# Patient Record
Sex: MALE | Race: WHITE | NOT HISPANIC OR LATINO | Employment: FULL TIME | URBAN - METROPOLITAN AREA
[De-identification: names, ages, dates, MRNs, and addresses within clinical notes are randomized per-mention and may not be internally consistent; named-entity substitution may affect disease eponyms.]

---

## 2017-02-07 ENCOUNTER — OFFICE VISIT - HEALTHEAST (OUTPATIENT)
Dept: FAMILY MEDICINE | Facility: CLINIC | Age: 52
End: 2017-02-07

## 2017-02-07 DIAGNOSIS — J06.9 URI (UPPER RESPIRATORY INFECTION): ICD-10-CM

## 2017-02-07 ASSESSMENT — MIFFLIN-ST. JEOR: SCORE: 1703.55

## 2017-12-06 ENCOUNTER — RECORDS - HEALTHEAST (OUTPATIENT)
Dept: ADMINISTRATIVE | Facility: OTHER | Age: 52
End: 2017-12-06

## 2018-05-09 ENCOUNTER — APPOINTMENT (OUTPATIENT)
Dept: MRI IMAGING | Facility: CLINIC | Age: 53
End: 2018-05-09
Attending: EMERGENCY MEDICINE
Payer: COMMERCIAL

## 2018-05-09 ENCOUNTER — HOSPITAL ENCOUNTER (EMERGENCY)
Facility: CLINIC | Age: 53
Discharge: HOME | End: 2018-05-09
Attending: EMERGENCY MEDICINE | Admitting: EMERGENCY MEDICINE
Payer: COMMERCIAL

## 2018-05-09 VITALS
WEIGHT: 180 LBS | OXYGEN SATURATION: 98 % | HEART RATE: 84 BPM | RESPIRATION RATE: 12 BRPM | SYSTOLIC BLOOD PRESSURE: 124 MMHG | TEMPERATURE: 98.6 F | DIASTOLIC BLOOD PRESSURE: 81 MMHG

## 2018-05-09 DIAGNOSIS — R42 DIZZINESS: ICD-10-CM

## 2018-05-09 DIAGNOSIS — R20.2 PARESTHESIAS: ICD-10-CM

## 2018-05-09 LAB
ANION GAP SERPL CALCULATED.3IONS-SCNC: 6 MMOL/L (ref 3–14)
BASOPHILS # BLD AUTO: 0 10E9/L (ref 0–0.2)
BASOPHILS NFR BLD AUTO: 0.3 %
BUN SERPL-MCNC: 22 MG/DL (ref 7–30)
CALCIUM SERPL-MCNC: 8.6 MG/DL (ref 8.5–10.1)
CHLORIDE SERPL-SCNC: 103 MMOL/L (ref 94–109)
CO2 SERPL-SCNC: 28 MMOL/L (ref 20–32)
CREAT SERPL-MCNC: 1.11 MG/DL (ref 0.66–1.25)
DIFFERENTIAL METHOD BLD: NORMAL
EOSINOPHIL # BLD AUTO: 0.1 10E9/L (ref 0–0.7)
EOSINOPHIL NFR BLD AUTO: 1.7 %
ERYTHROCYTE [DISTWIDTH] IN BLOOD BY AUTOMATED COUNT: 12.4 % (ref 10–15)
GFR SERPL CREATININE-BSD FRML MDRD: 70 ML/MIN/1.7M2
GLUCOSE SERPL-MCNC: 149 MG/DL (ref 70–99)
HCT VFR BLD AUTO: 43.2 % (ref 40–53)
HGB BLD-MCNC: 15.5 G/DL (ref 13.3–17.7)
IMM GRANULOCYTES # BLD: 0 10E9/L (ref 0–0.4)
IMM GRANULOCYTES NFR BLD: 0.3 %
INTERPRETATION ECG - MUSE: NORMAL
LYMPHOCYTES # BLD AUTO: 1.9 10E9/L (ref 0.8–5.3)
LYMPHOCYTES NFR BLD AUTO: 29.9 %
MAGNESIUM SERPL-MCNC: 2.3 MG/DL (ref 1.6–2.3)
MCH RBC QN AUTO: 32.1 PG (ref 26.5–33)
MCHC RBC AUTO-ENTMCNC: 35.9 G/DL (ref 31.5–36.5)
MCV RBC AUTO: 89 FL (ref 78–100)
MONOCYTES # BLD AUTO: 0.4 10E9/L (ref 0–1.3)
MONOCYTES NFR BLD AUTO: 5.8 %
NEUTROPHILS # BLD AUTO: 4 10E9/L (ref 1.6–8.3)
NEUTROPHILS NFR BLD AUTO: 62 %
NRBC # BLD AUTO: 0 10*3/UL
NRBC BLD AUTO-RTO: 0 /100
PLATELET # BLD AUTO: 182 10E9/L (ref 150–450)
POTASSIUM SERPL-SCNC: 3.9 MMOL/L (ref 3.4–5.3)
RBC # BLD AUTO: 4.83 10E12/L (ref 4.4–5.9)
SODIUM SERPL-SCNC: 137 MMOL/L (ref 133–144)
TROPONIN I SERPL-MCNC: <0.015 UG/L (ref 0–0.04)
VIT B12 SERPL-MCNC: 350 PG/ML (ref 193–986)
WBC # BLD AUTO: 6.4 10E9/L (ref 4–11)

## 2018-05-09 PROCEDURE — 83735 ASSAY OF MAGNESIUM: CPT | Performed by: EMERGENCY MEDICINE

## 2018-05-09 PROCEDURE — A9585 GADOBUTROL INJECTION: HCPCS | Performed by: EMERGENCY MEDICINE

## 2018-05-09 PROCEDURE — 70553 MRI BRAIN STEM W/O & W/DYE: CPT

## 2018-05-09 PROCEDURE — 82607 VITAMIN B-12: CPT | Performed by: EMERGENCY MEDICINE

## 2018-05-09 PROCEDURE — 80048 BASIC METABOLIC PNL TOTAL CA: CPT | Performed by: EMERGENCY MEDICINE

## 2018-05-09 PROCEDURE — 25000128 H RX IP 250 OP 636: Performed by: EMERGENCY MEDICINE

## 2018-05-09 PROCEDURE — 99285 EMERGENCY DEPT VISIT HI MDM: CPT | Mod: 25

## 2018-05-09 PROCEDURE — 85025 COMPLETE CBC W/AUTO DIFF WBC: CPT | Performed by: EMERGENCY MEDICINE

## 2018-05-09 PROCEDURE — 93005 ELECTROCARDIOGRAM TRACING: CPT

## 2018-05-09 PROCEDURE — 84484 ASSAY OF TROPONIN QUANT: CPT | Performed by: EMERGENCY MEDICINE

## 2018-05-09 RX ORDER — GADOBUTROL 604.72 MG/ML
8 INJECTION INTRAVENOUS ONCE
Status: COMPLETED | OUTPATIENT
Start: 2018-05-09 | End: 2018-05-09

## 2018-05-09 RX ADMIN — GADOBUTROL 8 ML: 604.72 INJECTION INTRAVENOUS at 21:08

## 2018-05-09 ASSESSMENT — ENCOUNTER SYMPTOMS
WEAKNESS: 0
NUMBNESS: 1
COUGH: 0
VOMITING: 0
FATIGUE: 0
LIGHT-HEADEDNESS: 1
DIZZINESS: 1
NAUSEA: 0
HEADACHES: 0
SHORTNESS OF BREATH: 0
FEVER: 0

## 2018-05-09 NOTE — ED AVS SNAPSHOT
Emergency Department    [address] [place] [address]    Phone:  [phone]    Fax:  [phone]                                       Lamont Ng   MRN: [id number]    Department:   Emergency Department   Date of Visit:  5/9/2018           After Visit Summary Signature Page     I have received my discharge instructions, and my questions have been answered. I have discussed any challenges I see with this plan with the nurse or doctor.    ..........................................................................................................................................  Patient/Patient Representative Signature      ..........................................................................................................................................  Patient Representative Print Name and Relationship to Patient    ..................................................               ................................................  Date                                            Time    ..........................................................................................................................................  Reviewed by Signature/Title    ...................................................              ..............................................  Date                                                            Time

## 2018-05-09 NOTE — ED AVS SNAPSHOT
Emergency Department    [address] [facility] [address]    Phone:  [phone]    Fax:  [phone]                                       Lamont Ng   MRN: [id number]    Department:   Emergency Department   Date of Visit:  5/9/2018           Patient Information     Date Of Birth          1965        Your diagnoses for this visit were:     Paresthesias     Dizziness        You were seen by Leodan Reynolds MD.      Follow-up Information     Follow up with Clinic, [facility].    Contact information:    [address] [facility] [address] [phone]          Discharge Instructions         Vertigo (Unknown Cause)    In addition to helping with hearing, the inner ear is part of the balance center of your body. Problems with the inner ear can a false feeling of motion. This is called vertigo. Often, it feels as if you or the room is spinning. A vertigo attack may cause sudden nausea, vomiting and heavy sweating. Severe vertigo causes a loss of balance and can cause you to fall. During vertigo, small head movements and changes in body position will often make the symptoms worse. You may also have ringing in the ears called tinnitus.  An episode of vertigo may last seconds, minutes or hours. Once you are over the first episode, it may never come back. However, symptoms may return off and on.  The cause of your vertigo is not yet known. Possible causes of vertigo include:    Inflammation of the inner ear    Disease of the nerves to the inner ear    Movement of calcium particles in the inner ear    Poor blood flow to the balance centers of the brain    Migraine headaches    In older adults, the use of more than one medicine along with some health conditions  Home care    If symptoms are severe, rest quietly in bed. Change positions very slowly. There is usually one position that will feel best, such as lying on one side or lying on your back with your head slightly raised on pillows.  "Until you have no symptoms, you are at a higher risk of falling. Let someone help you when you get up. Get rid of home hazards such as loose electrical cords and throw rugs. Don t walk in unfamiliar areas that are not lighted. Use night lights in bathrooms and kitchen areas.    Do not drive a car or work with dangerous machinery until symptoms have been gone for at least one week.    Take medicine as prescribed to relieve your symptoms. Unless another medicine was prescribed for symptoms of nausea, vomiting, and dizziness, you may use over-the-counter motion sickness pills. Ask your pharmacist for suggestions.  Follow-up care  Follow up with your healthcare provider or as directed. If you are referred to a specialist or for testing, make the appointment promptly.  When to seek medical advice  Call your healthcare provider if any of the following occur:    Fever of 100.4 F (38 C) or higher, or as directed by your healthcare provider    Vertigo worsens or is not controlled by prescribed medicine     Repeated vomiting not relieved by prescribed medicine     Severe headache    Confusion    Weakness of an arm or leg or 1 side of the face    Difficulty with speech or vision    Loss of consciousness     Seizure  Date Last Reviewed: 11/1/2017 2000-2017 [employer]. [address] [place] [address]. All rights reserved. This information is not intended as a substitute for professional medical care. Always follow your healthcare professional's instructions.          Paraesthesias  Paraesthesia is a burning or prickling sensation that is sometimes felt in the hands, arms, legs or feet. It can also occur in other parts of the body. It can also feel like tingling or numbness, skin crawling, or itching. The feeling is not comfortable, but it is not painful. (The \"pins and needles\" feeling that happens when a foot or hand \"falls asleep\" is a temporary paraesthesia.)  Paraesthesias that last or come and go " may be caused by medical issues that need to be treated. These include stroke, a bulging disk pressing on a nerve, a trapped nerve, vitamin deficiencies, or even certain medicines.  Tests are often done. These tests may include blood tests, X-ray, CT (computerized tomography) scan, or a muscle test (electromyography). Depending on the cause, treatment may include physical therapy.  Home care    Tell the healthcare provider about all medicines you take. This includes prescription and over-the-counter medicines, vitamins, and herbs. Ask if any of the medicines may be causing your problems. Do not make any changes to prescription medicines without talking to your healthcare provider first.    You may be prescribed medicines to help relieve the tingling feeling or for pain. Take all medicines as directed.    A numb hand or foot may be more prone to injury. To help protect it:  ? Always use oven mitts.  ? Test water with an unaffected hand or foot.  ? Use caution when trimming nails. File sharp areas.  ? Wear shoes that fit well to avoid pressure points, blisters, and ulcers.  ? Inspect your hands and feet carefully (including the soles of your feet and between your toes) at least once a week. If you see red areas, sores, or other problems, tell your healthcare provider.  Follow-up care  Follow up with your doctor or as advised by our staff. You may need further testing or evaluation.  When to seek medical advice  Call your healthcare provider right away if any of the following occur:    Numbness or weakness of the face, one arm, or one leg    Slurred speech, confusion, trouble speaking, walking, or seeing    Severe headache, fainting spell, dizziness, or seizure    Chest, arm, neck, or upper back pain    Loss of bladder or bowel control    Open wound with redness, swelling, or pus  Date Last Reviewed: 9/25/2015 2000-2017 [employer]. [address] [place] [address], [place] [address]. All rights reserved. This  information is not intended as a substitute for professional medical care. Always follow your healthcare professional's instructions.          24 Hour Appointment Hotline       To make an appointment at any [facility], call [phone] ([phone]). If you don't have a family doctor or clinic, we will help you find one. [facility] are conveniently located to serve the needs of you and your family.             Review of your medicines      Notice     You have not been prescribed any medications.            Procedures and tests performed during your visit     Basic metabolic panel    CBC with platelets differential    EKG 12-lead, tracing only    MR Brain w/o & w Contrast    Magnesium    Troponin I    Vitamin B12      Orders Needing Specimen Collection     None      Pending Results     Date and Time Order Name Status Description    5/9/2018 1847 Vitamin B12 In process             Pending Culture Results     No orders found from 5/7/2018 to 5/10/2018.            Pending Results Instructions     If you had any lab results that were not finalized at the time of your Discharge, you can call the ED Lab Result RN at [phone]. You will be contacted by this team for any positive Lab results or changes in treatment. The nurses are available 7 days a week from 10A to 6:30P.  You can leave a message 24 hours per day and they will return your call.        Test Results From Your Hospital Stay        5/9/2018  6:56 PM      Component Results     Component Value Ref Range & Units Status    WBC 6.4 4.0 - 11.0 10e9/L Final    RBC Count 4.83 4.4 - 5.9 10e12/L Final    Hemoglobin 15.5 13.3 - 17.7 g/dL Final    Hematocrit 43.2 40.0 - 53.0 % Final    MCV 89 78 - 100 fl Final    MCH 32.1 26.5 - 33.0 pg Final    MCHC 35.9 31.5 - 36.5 g/dL Final    RDW 12.4 10.0 - 15.0 % Final    Platelet Count 182 150 - 450 10e9/L Final    Diff Method Automated Method  Final    % Neutrophils 62.0 % Final    % Lymphocytes 29.9 % Final     % Monocytes 5.8 % Final    % Eosinophils 1.7 % Final    % Basophils 0.3 % Final    % Immature Granulocytes 0.3 % Final    Nucleated RBCs 0 0 /100 Final    Absolute Neutrophil 4.0 1.6 - 8.3 10e9/L Final    Absolute Lymphocytes 1.9 0.8 - 5.3 10e9/L Final    Absolute Monocytes 0.4 0.0 - 1.3 10e9/L Final    Absolute Eosinophils 0.1 0.0 - 0.7 10e9/L Final    Absolute Basophils 0.0 0.0 - 0.2 10e9/L Final    Abs Immature Granulocytes 0.0 0 - 0.4 10e9/L Final    Absolute Nucleated RBC 0.0  Final         5/9/2018  7:12 PM      Component Results     Component Value Ref Range & Units Status    Sodium 137 133 - 144 mmol/L Final    Potassium 3.9 3.4 - 5.3 mmol/L Final    Chloride 103 94 - 109 mmol/L Final    Carbon Dioxide 28 20 - 32 mmol/L Final    Anion Gap 6 3 - 14 mmol/L Final    Glucose 149 (H) 70 - 99 mg/dL Final    Urea Nitrogen 22 7 - 30 mg/dL Final    Creatinine 1.11 0.66 - 1.25 mg/dL Final    GFR Estimate 70 >60 mL/min/1.7m2 Final    Non  GFR Calc    GFR Estimate If Black 84 >60 mL/min/1.7m2 Final    African American GFR Calc    Calcium 8.6 8.5 - 10.1 mg/dL Final         5/9/2018  7:17 PM      Component Results     Component Value Ref Range & Units Status    Troponin I ES <0.015 0.000 - 0.045 ug/L Final    The 99th percentile for upper reference range is 0.045 ug/L.  Troponin values   in the range of 0.045 - 0.120 ug/L may be associated with risks of adverse   clinical events.           5/9/2018 10:00 PM      Narrative     MRI BRAIN WITHOUT AND WITH CONTRAST  5/9/2018 9:09 PM    HISTORY:  Vague dizziness for a few months, getting more constant,  tonight double vision, right facial numbness.      TECHNIQUE:  Multiplanar, multisequence MRI of the brain without and  with 8 mL Gadavist.    COMPARISON: None.    FINDINGS: There is no evidence of hemorrhage, mass, acute infarct, or  anomaly. The brain parenchyma, ventricles and subarachnoid spaces  appear normal.     There is no abnormal intracranial  enhancement.     The facial structures appear normal. The major arterial T2 flow voids  at the base of the brain appear patent.        Impression     IMPRESSION:  Normal MRI of the brain.      STEPHEN WALDRON MD         5/9/2018  7:51 PM      Component Results     Component Value Ref Range & Units Status    Magnesium 2.3 1.6 - 2.3 mg/dL Final         5/9/2018  7:46 PM                Clinical Quality Measure: Blood Pressure Screening     Your blood pressure was checked while you were in the emergency department today. The last reading we obtained was  BP: 124/81 . Please read the guidelines below about what these numbers mean and what you should do about them.  If your systolic blood pressure (the top number) is less than 120 and your diastolic blood pressure (the bottom number) is less than 80, then your blood pressure is normal. There is nothing more that you need to do about it.  If your systolic blood pressure (the top number) is 120-139 or your diastolic blood pressure (the bottom number) is 80-89, your blood pressure may be higher than it should be. You should have your blood pressure rechecked within a year by a primary care provider.  If your systolic blood pressure (the top number) is 140 or greater or your diastolic blood pressure (the bottom number) is 90 or greater, you may have high blood pressure. High blood pressure is treatable, but if left untreated over time it can put you at risk for heart attack, stroke, or kidney failure. You should have your blood pressure rechecked by a primary care provider within the next 4 weeks.  If your provider in the emergency department today gave you specific instructions to follow-up with your doctor or provider even sooner than that, you should follow that instruction and not wait for up to 4 weeks for your follow-up visit.        Thank you for choosing [facility]       Thank you for choosing [facility] for your care. Our goal is always to provide you with excellent care.  "Hearing back from our patients is one way we can continue to improve our services. Please take a few minutes to complete the written survey that you may receive in the mail after you visit with us. Thank you!        [employer] Information     [employer] lets you send messages to your doctor, view your test results, renew your prescriptions, schedule appointments and more. To sign up, go to [email or web address] . Click on \"Log in\" on the left side of the screen, which will take you to the Welcome page. Then click on \"Sign up Now\" on the right side of the page.     You will be asked to enter the access code listed below, as well as some personal information. Please follow the directions to create your username and password.     Your access code is: [email or web address]  Expires: 2018 10:21 PM     Your access code will  in 90 days. If you need help or a new code, please call your [facility] clinic or [phone].        Care EveryWhere ID     This is your Care EveryWhere ID. This could be used by other organizations to access your [facility] medical records  [id number]        Equal Access to Services     KARUNA CASTILLO : Hadii ayah Nix, yamilka moise, qabhupendra day, anette garcia . So [facility] [phone].    ATENCIÓN: Si habla español, tiene a benito disposición servicios gratuitos de asistencia lingüística. Robbie al [phone].    We comply with applicable federal civil rights laws and [place] laws. We do not discriminate on the basis of race, color, national origin, age, disability, sex, sexual orientation, or gender identity.            After Visit Summary       This is your record. Keep this with you and show to your community pharmacist(s) and doctor(s) at your next visit.                  "

## 2018-05-10 NOTE — DISCHARGE INSTRUCTIONS
Vertigo (Unknown Cause)    In addition to helping with hearing, the inner ear is part of the balance center of your body. Problems with the inner ear can a false feeling of motion. This is called vertigo. Often, it feels as if you or the room is spinning. A vertigo attack may cause sudden nausea, vomiting and heavy sweating. Severe vertigo causes a loss of balance and can cause you to fall. During vertigo, small head movements and changes in body position will often make the symptoms worse. You may also have ringing in the ears called tinnitus.  An episode of vertigo may last seconds, minutes or hours. Once you are over the first episode, it may never come back. However, symptoms may return off and on.  The cause of your vertigo is not yet known. Possible causes of vertigo include:    Inflammation of the inner ear    Disease of the nerves to the inner ear    Movement of calcium particles in the inner ear    Poor blood flow to the balance centers of the brain    Migraine headaches    In older adults, the use of more than one medicine along with some health conditions  Home care    If symptoms are severe, rest quietly in bed. Change positions very slowly. There is usually one position that will feel best, such as lying on one side or lying on your back with your head slightly raised on pillows. Until you have no symptoms, you are at a higher risk of falling. Let someone help you when you get up. Get rid of home hazards such as loose electrical cords and throw rugs. Don t walk in unfamiliar areas that are not lighted. Use night lights in bathrooms and kitchen areas.    Do not drive a car or work with dangerous machinery until symptoms have been gone for at least one week.    Take medicine as prescribed to relieve your symptoms. Unless another medicine was prescribed for symptoms of nausea, vomiting, and dizziness, you may use over-the-counter motion sickness pills. Ask your pharmacist for suggestions.  Follow-up  "care  Follow up with your healthcare provider or as directed. If you are referred to a specialist or for testing, make the appointment promptly.  When to seek medical advice  Call your healthcare provider if any of the following occur:    Fever of 100.4 F (38 C) or higher, or as directed by your healthcare provider    Vertigo worsens or is not controlled by prescribed medicine     Repeated vomiting not relieved by prescribed medicine     Severe headache    Confusion    Weakness of an arm or leg or 1 side of the face    Difficulty with speech or vision    Loss of consciousness     Seizure  Date Last Reviewed: 11/1/2017 2000-2017 [employer]. [address] [place] [address]. All rights reserved. This information is not intended as a substitute for professional medical care. Always follow your healthcare professional's instructions.          Paraesthesias  Paraesthesia is a burning or prickling sensation that is sometimes felt in the hands, arms, legs or feet. It can also occur in other parts of the body. It can also feel like tingling or numbness, skin crawling, or itching. The feeling is not comfortable, but it is not painful. (The \"pins and needles\" feeling that happens when a foot or hand \"falls asleep\" is a temporary paraesthesia.)  Paraesthesias that last or come and go may be caused by medical issues that need to be treated. These include stroke, a bulging disk pressing on a nerve, a trapped nerve, vitamin deficiencies, or even certain medicines.  Tests are often done. These tests may include blood tests, X-ray, CT (computerized tomography) scan, or a muscle test (electromyography). Depending on the cause, treatment may include physical therapy.  Home care    Tell the healthcare provider about all medicines you take. This includes prescription and over-the-counter medicines, vitamins, and herbs. Ask if any of the medicines may be causing your problems. Do not make any changes to " prescription medicines without talking to your healthcare provider first.    You may be prescribed medicines to help relieve the tingling feeling or for pain. Take all medicines as directed.    A numb hand or foot may be more prone to injury. To help protect it:  ? Always use oven mitts.  ? Test water with an unaffected hand or foot.  ? Use caution when trimming nails. File sharp areas.  ? Wear shoes that fit well to avoid pressure points, blisters, and ulcers.  ? Inspect your hands and feet carefully (including the soles of your feet and between your toes) at least once a week. If you see red areas, sores, or other problems, tell your healthcare provider.  Follow-up care  Follow up with your doctor or as advised by our staff. You may need further testing or evaluation.  When to seek medical advice  Call your healthcare provider right away if any of the following occur:    Numbness or weakness of the face, one arm, or one leg    Slurred speech, confusion, trouble speaking, walking, or seeing    Severe headache, fainting spell, dizziness, or seizure    Chest, arm, neck, or upper back pain    Loss of bladder or bowel control    Open wound with redness, swelling, or pus  Date Last Reviewed: 9/25/2015 2000-2017 [employer]. [address] [place] [address], [place] [address]. All rights reserved. This information is not intended as a substitute for professional medical care. Always follow your healthcare professional's instructions.

## 2018-05-10 NOTE — ED PROVIDER NOTES
History     Chief Complaint:  Dizziness    HPI   Lamont Ng is a 53 year old male who presents with dizziness. The patient reports that he has been experiencing symptoms intermittently over the past 6 months including dizziness and lightheadedness. Patient regularly gets a physical exam done at [facility] and he was seen by them in March for similar symptoms. At that time, he had an EKG performed along with a STRESS test and blood work; no abnormal etiology was iundentified at this visit. He was advised at this time to possibly cut down on caffeine; he has done this and has not noticed any significant change in his symptoms. After this time, he went down to [place] with his children, and throughout that time he was asymptomatic other than some anxiety about the trip.     Within past couple of weeks, his lightheadedness has become more prevalent. He relates this lightheadedness as being similar to having 1-2 glasses of wine, but wihtout any alcohol ingestion. He presents today for evaluation of these symptoms as well as having facial paraesthesias and some visual disturbances that occurred earlier today. He states he is concerned for sinister pathology and would like to have life threatening etiologies ruled out today. He denies any chest pain, shortness of breath, numbness, weakness, or any other symptoms.    Allergies:  No known drug allergies.    Medications:    The patient is not currently taking any prescribed medications.     Past Medical History:    Cancer    Past Surgical History:    Hernia repair  Soft tissue surgery    Family History:    No pertinent family history.    Social History:  Smoking status: Never smoker  Alcohol use: Yes  Marital Status:       Review of Systems   Constitutional: Negative for fatigue and fever.   Eyes: Negative for visual disturbance.   Respiratory: Negative for cough and shortness of breath.    Gastrointestinal: Negative for nausea and vomiting.   Neurological:  Positive for dizziness, light-headedness and numbness. Negative for weakness and headaches.   All other systems reviewed and are negative.    Physical Exam     Patient Vitals for the past 24 hrs:   BP Temp Temp src Pulse Heart Rate Resp SpO2 Weight   05/09/18 2200 124/81 - - - - - 98 % -   05/09/18 2145 129/86 - - - - - 98 % -   05/09/18 2130 (!) 129/107 - - - - - 97 % -   05/09/18 2115 134/85 - - - - - 97 % -   05/09/18 1939 (!) 143/92 - - - - - - -   05/09/18 1859 - - - - 77 12 - -   05/09/18 1849 - 98.6  F (37  C) Temporal - - - - -   05/09/18 1836 146/87 - Oral 84 - 16 100 % 81.6 kg (180 lb)         Physical Exam  GENERAL: well developed, pleasant  HEAD: atraumatic  EYES: pupils reactive, extraocular muscles intact, conjunctivae normal  ENT:  mucus membranes moist  NECK:  trachea midline, normal range of motion  RESPIRATORY: no tachypnea, breath sounds clear to auscultation   CVS: normal S1/S2, no murmurs, intact distal pulses  ABDOMEN: soft, nontender, nondistention  MUSCULOSKELETAL: no deformities  SKIN: warm and dry, no acute rashes or ulceration  NEURO: GCS 15, cranial nerves intact, alert and oriented x3  PSYCH:  Mood/affect normal    Emergency Department Course   ECG:  @ 1845  Indication: dizziness  Vent. Rate 85 bpm. MI interval 154 ms. QRS duration 84 ms. QT/QTc 344/409 ms. P-R-T axis 57 29 29.   Normal sinus rhythm. Normal ECG.    Read @ 1930 by Dr. Reynolds.    Imaging:  MRI Brain w/o & w/ contrast:  IMPRESSION:  Normal MRI of the brain.   Report per radiology.    Laboratory:  CBC:  WBC 6.4, HGB 15.5, , WNL  BMP: Glucose 149 (H), otherwise WNL (Creatinine 1.11)  (1847) Troponin I: <0.015  (1847) Magnesium: 2.3  (1847) Vitamin B12: PENDING    Interventions:  (2108) Gadavist, 10 mL, IV injection    Emergency Department Course:  Nursing notes and vitals reviewed.  (1921) I performed an exam of the patient as documented above.    EKG was done, interpretation as above.  Blood was drawn from the patient.  This was sent for laboratory testing, findings above.   The patient was sent for a MRI while in the emergency department, findings above.     Findings and plan explained to the patient. Patient discharged home with instructions regarding supportive care, medications, and reasons to return. The importance of close follow-up was reviewed.   Impression & Plan    Medical Decision Making:  Lamont Ng is a 53 year old male who presents with dizziness and an episode of paraesthesias this evening and some double vision. Labs and MRI were obtained and are normal.  Patient otherwise has a normal neurologic exam. He is not on any medications. We did send off a vitamin B12 level, although this is pending. Unclear as to the etiology but otherwise looks normal. Continue outpatient management with primary.     Diagnosis:    ICD-10-CM   1. Paresthesias R20.2   2. Dizziness R42       Disposition:  Patient is discharged to home.          Dago PAYNE, am serving as a scribe on 5/9/2018 at 7:21 PM to personally document services performed by Dr. Reynolds based on my observations and the provider's statements to me.      Dago Joy  5/9/2018    EMERGENCY DEPARTMENT       Leodan Reynolds MD  05/13/18 8548

## 2019-03-25 ENCOUNTER — COMMUNICATION - HEALTHEAST (OUTPATIENT)
Dept: INTERNAL MEDICINE | Facility: CLINIC | Age: 54
End: 2019-03-25

## 2019-04-03 ENCOUNTER — OFFICE VISIT - HEALTHEAST (OUTPATIENT)
Dept: INTERNAL MEDICINE | Facility: CLINIC | Age: 54
End: 2019-04-03

## 2019-04-03 DIAGNOSIS — Z00.00 ROUTINE GENERAL MEDICAL EXAMINATION AT A HEALTH CARE FACILITY: ICD-10-CM

## 2019-04-03 ASSESSMENT — MIFFLIN-ST. JEOR: SCORE: 1713.75

## 2019-10-29 ENCOUNTER — OFFICE VISIT - HEALTHEAST (OUTPATIENT)
Dept: INTERNAL MEDICINE | Facility: CLINIC | Age: 54
End: 2019-10-29

## 2019-10-29 DIAGNOSIS — R42 DIZZINESS: ICD-10-CM

## 2019-10-29 ASSESSMENT — MIFFLIN-ST. JEOR: SCORE: 1713.75

## 2019-11-07 ENCOUNTER — HOSPITAL ENCOUNTER (OUTPATIENT)
Dept: MRI IMAGING | Facility: CLINIC | Age: 54
Discharge: HOME | End: 2019-11-07
Attending: INTERNAL MEDICINE

## 2019-11-07 DIAGNOSIS — R42 DIZZINESS: ICD-10-CM

## 2019-12-09 ENCOUNTER — RECORDS - HEALTHEAST (OUTPATIENT)
Dept: ADMINISTRATIVE | Facility: OTHER | Age: 54
End: 2019-12-09

## 2020-02-10 ENCOUNTER — RECORDS - HEALTHEAST (OUTPATIENT)
Dept: LAB | Facility: CLINIC | Age: 55
End: 2020-02-10

## 2020-02-10 LAB
FOLATE SERPL-MCNC: 13.1 NG/ML
LYME TOTAL ANTIBODY - HISTORICAL: 0.07 INDEX VALUE
TSH SERPL DL<=0.005 MIU/L-ACNC: 2.8 UIU/ML (ref 0.3–5)
VIT B12 SERPL-MCNC: 478 PG/ML (ref 213–816)

## 2020-02-11 LAB — ANA SER QL: 0.2 U

## 2020-02-12 LAB — METHYLMALONATE SERPL-SCNC: 0.13 UMOL/L (ref 0–0.4)

## 2020-03-13 ENCOUNTER — OFFICE VISIT - HEALTHEAST (OUTPATIENT)
Dept: INTERNAL MEDICINE | Facility: CLINIC | Age: 55
End: 2020-03-13

## 2020-03-13 ENCOUNTER — COMMUNICATION - HEALTHEAST (OUTPATIENT)
Dept: SCHEDULING | Facility: CLINIC | Age: 55
End: 2020-03-13

## 2020-03-13 ENCOUNTER — RECORDS - HEALTHEAST (OUTPATIENT)
Dept: GENERAL RADIOLOGY | Facility: CLINIC | Age: 55
End: 2020-03-13

## 2020-03-13 ENCOUNTER — COMMUNICATION - HEALTHEAST (OUTPATIENT)
Dept: INTERNAL MEDICINE | Facility: CLINIC | Age: 55
End: 2020-03-13

## 2020-03-13 DIAGNOSIS — K59.01 SLOW TRANSIT CONSTIPATION: ICD-10-CM

## 2020-03-26 ENCOUNTER — RECORDS - HEALTHEAST (OUTPATIENT)
Dept: ADMINISTRATIVE | Facility: OTHER | Age: 55
End: 2020-03-26

## 2020-04-14 ENCOUNTER — OFFICE VISIT - HEALTHEAST (OUTPATIENT)
Dept: INTERNAL MEDICINE | Facility: CLINIC | Age: 55
End: 2020-04-14

## 2020-04-14 DIAGNOSIS — K58.9 IRRITABLE BOWEL SYNDROME WITHOUT DIARRHEA: ICD-10-CM

## 2020-06-30 ENCOUNTER — RECORDS - HEALTHEAST (OUTPATIENT)
Dept: ADMINISTRATIVE | Facility: OTHER | Age: 55
End: 2020-06-30

## 2020-09-18 ENCOUNTER — OFFICE VISIT - HEALTHEAST (OUTPATIENT)
Dept: INTERNAL MEDICINE | Facility: CLINIC | Age: 55
End: 2020-09-18

## 2020-09-18 DIAGNOSIS — R05.9 COUGH: ICD-10-CM

## 2020-09-18 DIAGNOSIS — E88.810 METABOLIC SYNDROME: ICD-10-CM

## 2020-11-12 ENCOUNTER — OFFICE VISIT - HEALTHEAST (OUTPATIENT)
Dept: INTERNAL MEDICINE | Facility: CLINIC | Age: 55
End: 2020-11-12

## 2020-11-12 DIAGNOSIS — R05.9 COUGH: ICD-10-CM

## 2020-12-02 ENCOUNTER — HOSPITAL ENCOUNTER (OUTPATIENT)
Dept: CT IMAGING | Facility: CLINIC | Age: 55
Discharge: HOME | End: 2020-12-02
Attending: INTERNAL MEDICINE

## 2020-12-02 DIAGNOSIS — R05.9 COUGH: ICD-10-CM

## 2021-04-07 ENCOUNTER — COMMUNICATION - HEALTHEAST (OUTPATIENT)
Dept: ADMINISTRATIVE | Facility: CLINIC | Age: 56
End: 2021-04-07

## 2021-04-15 ENCOUNTER — OFFICE VISIT - HEALTHEAST (OUTPATIENT)
Dept: INTERNAL MEDICINE | Facility: CLINIC | Age: 56
End: 2021-04-15

## 2021-04-15 DIAGNOSIS — R05.9 COUGH: ICD-10-CM

## 2021-04-15 ASSESSMENT — PATIENT HEALTH QUESTIONNAIRE - PHQ9: SUM OF ALL RESPONSES TO PHQ QUESTIONS 1-9: 0

## 2021-05-08 ENCOUNTER — HEALTH MAINTENANCE LETTER (OUTPATIENT)
Age: 56
End: 2021-05-08

## 2021-05-27 ASSESSMENT — PATIENT HEALTH QUESTIONNAIRE - PHQ9: SUM OF ALL RESPONSES TO PHQ QUESTIONS 1-9: 0

## 2021-05-27 NOTE — TELEPHONE ENCOUNTER
New Appointment Needed  What is the reason for the visit:    Dizzy with numbness on right side of cheek x 1.5 years.  Dr. Romero is father's physician and would like to know if Dr. Romero would accept him as an establish care patient. (he's currently not accepting establish care patients at this time) He's actually a family friend of patient as well.  Provider Preference: Heather  How soon do you need to be seen?: ASAP anytime over the next few weeks  Waitlist offered?: N/A  Okay to leave a detailed message:  Yes

## 2021-05-27 NOTE — PROGRESS NOTES
Office Visit -new patient    Harshad Ng   53 y.o. male    Date of Visit: 4/3/2019    Chief Complaint   Patient presents with     [facility] care new pt from [place], dizzy and numbness on right side of face        Subjective: New patient 53-year-old.  Establish care.  Dizziness.  Generally feels good.  In May 2017 the right side of his face became numb with sporadic spells of paresthesias.    [facility] evaluation every 18 months for executive examination.    Dizziness son's baseball game.  Brother-in-law had a stroke.    3 episodes of melanoma.  Once an MRI scan was done of the head in May 2018 [facility].  [place] evaluation for cardiovascular disease negative.  Psychologic examination at the [facility].    Spring 2018 nervous about traveling with 4 small sons to [place].  Psych eval ablation done also for some dizziness not vertigo.  4 sons ages 8/10/2012 and 14 busy with sports and academia.    No blood in stool or urine no chest pain or shortness of breath medication list reviewed none.    Acid reflux-like symptoms better with Tums at home consider Pepcid AC.    ROS: A comprehensive review of systems was performed and was otherwise negative    Medications:  Prior to Admission medications    Medication Sig Start Date End Date Taking? Authorizing Provider   ketoconazole (NIZORAL) 2 % cream 1 thin layer daily 4/3/19   Harshad Romero MD   omega 1-djp-yfb-fish oil 500-1,000 mg cap Take 1,000 mg by mouth.    PROVIDER, HISTORICAL   VITAMIN B COMPLEX ORAL Take 1 capsule by mouth.    PROVIDER, HISTORICAL       Allergies: No Known Allergies    Immunizations:   Immunization History   Administered Date(s) Administered     Influenza, inj, historic,unspecified 11/16/2005, 10/01/2009, 10/16/2012, 12/10/2013     Td,adult,historic,unspecified 01/15/2004     Tdap 07/21/2010       Exam Chest clear to auscultation and percussion.  Heart tones regular rhythm without murmur rub or gallop.  Abdomen soft  nontender no organomegaly.  No peritoneal signs.  Extremities free of edema cyanosis or clubbing.  Neck veins nondistended no thyromegaly or scleral icterus noted, carotids full.  Skin warm and dry easily conversant good spirited.  Normal intelligence.  Neurologically intact no gross localizing findings.  Tinea-like eruption right axilla.  3 cases in this patient of melanoma being resected.  Every 6 months sees dermatology consultants for full skin examination.    Assessment and Plan  Dizziness uncertain etiology.  Consider citalopram 1020 or 40 mg daily.  Patient will call in 2 weeks time to rediscuss.    Acid reflux disease can try Pepcid AC 20 mg at bedtime or during the day.    Melanoma x3 dermatologic consultation every 6 months encouraged.    Dermatophyte infestation right axilla tinea corporis.  Nizoral thin layer once daily.  Cream        Time: total time spent with the patient was 40 minutes of which >50% was spent in counseling and coordination of care    The following high BMI interventions were performed this visit: encouragement to exercise    Harshad Romero MD    There is no problem list on file for this patient.

## 2021-05-27 NOTE — TELEPHONE ENCOUNTER
Dr. Romero,  Would you be willing to see this patient?  Please advise.  Thank you.  Makenna HOFFMAN, GERA/CMT....................4:28 PM

## 2021-05-27 NOTE — TELEPHONE ENCOUNTER
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

## 2021-05-27 NOTE — TELEPHONE ENCOUNTER
Left message to call back for: Harshad  Information to relay to patient:  Please relay message below from Dr. Romero and help the patient to schedule appointment per message below from Dr. Romero.  Thank you.  Makenna HOFFMAN, GERA/CMT....................8:10 AM

## 2021-05-30 VITALS — WEIGHT: 182 LBS | BODY MASS INDEX: 24.65 KG/M2 | HEIGHT: 72 IN

## 2021-06-01 ENCOUNTER — RECORDS - HEALTHEAST (OUTPATIENT)
Dept: ADMINISTRATIVE | Facility: CLINIC | Age: 56
End: 2021-06-01

## 2021-06-02 VITALS — BODY MASS INDEX: 25.19 KG/M2 | HEIGHT: 72 IN | WEIGHT: 186 LBS

## 2021-06-02 NOTE — PROGRESS NOTES
Office Visit - Follow up    Harshad Ng   54 y.o. male    Date of Visit: 10/29/2019    Chief Complaint   Patient presents with     Dizziness     comes and goes     Headache     for last 5 days     Sinus Problem       Subjective: Dizziness.    Comes and goes sinus pressure.  Not febrile no chills no yellow-green sputum no nasal discharge or blood.    Previously thought to be anxiety driven dizziness at the [facility].  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    Light to moderate dizziness more prevalent no vertigo one beer feels like 2 beers.  Hockey through college no recent head trauma but may have had a concussion through high school or college sports.  Numbness on the right cheek as well.  Prior MRI scans have been done negative in May 2018.  Neuro consult done at the [facility] every 2-year executive physical exam is done at the [facility].  Labs and urine tests have been allCLEAR at the [facility] as well.    No blood in stool or urine no chest pain shortness of breath medication list reviewed reconciled in chart.    ROS: A comprehensive review of systems was performed and was otherwise negative    Medications:  Prior to Admission medications    Medication Sig Start Date End Date Taking? Authorizing Provider   omega 3-dha-epa-fish oil 500-1,000 mg cap Take 1,000 mg by mouth.   Yes PROVIDER, HISTORICAL   SELENIUM ORAL Take by mouth.   Yes PROVIDER, HISTORICAL   VITAMIN B COMPLEX ORAL Take 1 capsule by mouth.   Yes PROVIDER, HISTORICAL   ketoconazole (NIZORAL) 2 % cream 1 thin layer daily 4/3/19 10/29/19  Harshad Romero MD       Allergies: No Known Allergies    Immunizations:   Immunization History   Administered Date(s) Administered     Influenza, inj, historic,unspecified 11/16/2005, 10/01/2009, 10/16/2012, 12/10/2013     Td,adult,historic,unspecified 01/15/2004     Tdap 07/21/2010       Exam Chest clear to auscultation and percussion.  Heart tones regular rhythm without  murmur rub or gallop.  Abdomen soft nontender no organomegaly.  No peritoneal signs.  Extremities free of edema cyanosis or clubbing.  Neck veins nondistended no thyromegaly or scleral icterus noted, carotids full.  Skin warm and dry easily conversant good spirited.  Normal intelligence.  Neurologically intact no gross localizing findings.    122/74 pulse 82 respirations 18 O2 sats 99% BMI 26.  Weight stable 186 pounds.    Assessment and Plan  Dizziness uncertain etiology.  Check MRI scan of brain without contrast plus neurologic consultation.    Sinusitis viral resolving.  Anxiety driven dizziness [facility] diagnosis with every 2-year executive physical exams and neurologic check there as well.  RTC 6 weeks time or call.    Time:     The following high BMI interventions were performed this visit: encouragement to exercise    Harshad Romero MD    There is no problem list on file for this patient.

## 2021-06-03 VITALS
HEART RATE: 82 BPM | OXYGEN SATURATION: 99 % | HEIGHT: 72 IN | DIASTOLIC BLOOD PRESSURE: 74 MMHG | BODY MASS INDEX: 25.19 KG/M2 | WEIGHT: 186 LBS | SYSTOLIC BLOOD PRESSURE: 122 MMHG

## 2021-06-04 VITALS
OXYGEN SATURATION: 98 % | HEART RATE: 85 BPM | SYSTOLIC BLOOD PRESSURE: 124 MMHG | WEIGHT: 185 LBS | DIASTOLIC BLOOD PRESSURE: 84 MMHG | BODY MASS INDEX: 25.44 KG/M2

## 2021-06-06 NOTE — TELEPHONE ENCOUNTER
Left message to call back for: Lamont  Information to relay to patient:  Please get more information from patient (it looks like he has an appt with Dr. Wylie today)    (Dr. Romero is out today and Neela is working with another doctor)

## 2021-06-06 NOTE — TELEPHONE ENCOUNTER
FYI - Status Update  Who is Calling: Patient  Update: wants Just to give him a call regarding his lower abdominal pain  Okay to leave a detailed message?:  Yes     Yes

## 2021-06-06 NOTE — TELEPHONE ENCOUNTER
Typically has a BM 3 times a day.    Having a bloating/pressure sensation right below the belt.    No nausea     No vomiting    No straining    No abdominal pain    No fever    Home treatments given and told to make an appointments after he comes back from his trip.    Cynthia Russell RN   [facility] Medication Refill and Triage Nurse  8:09 AM  3/13/2020      Reason for Disposition    Mild constipation    Protocols used: CONSTIPATION-A-OH

## 2021-06-06 NOTE — PROGRESS NOTES
[facility] Clinic Note  Harshad Ng   54 y.o. male    Date of Visit: 3/13/2020  Chief Complaint   Patient presents with     Constipation     X2 days - did have two BM's today that were sufficent size - started miralax and a probiotic     Assessment/Plan  1. Slow transit constipation  Reviewed radiograph.  Patient has mild to moderate constipation stool burden with no ominous findings otherwise.  Counseled to take Colace 100 mg daily or twice daily, stay well-hydrated and eat high-soluble fiber foods.  Can use MiraLAX as needed  - XR Abdomen AP; Future     Much or all of the text in this note was generated through the use of Dragon Dictate voice-to-text software. Errors in spelling or words which seem out of context are unintentional. Sound alike errors, in particular, may have escaped editing  Des Wylie MD    Return if symptoms worsen or fail to improve.    Subjective  This 54 y.o. old male. Here with complaint of abdominal pain. 3 weeks ago, endorses feeling bloated. Has a BM 3 X a day. Firm and large, and a bit thinner more recently. No bleeding. No new foods or medications. Yesterday, went only twice with small output and felt some discomfort/pain under the belt line. States his wife has struggled with her BMs for many years and recommended he take a probiotic and miralax 17 g daily, both for 5 days. Stools are softer now. Traveling to [place] tomorrow. Mainly, c/o cramps and bloated. Has cut down alcohol and increased activity.  Had colonoscopy in 12/2017 with a 3 mm hyperplastic polyp with plan to repeat in 2027    ROS A comprehensive review of systems was performed and was otherwise negative    Medications, allergies, and problem list were reviewed and updated    Exam  General appearance: Pleasant, nontoxic-appearing, no acute distress, alert and oriented x4  Vitals:    03/13/20 1412   BP: 124/84   Pulse: 85   SpO2: 98%   RESPIRATORY: Bilaterally with no crackles,  wheezing or rhonchi  CARDIOVASCULAR: Regular S1 and S2.  Radial pulses intact.  No edema.  GASTROINTESTINAL: NABS, soft, nontender, slightly/moderately distended, no hepatomegaly  GENITOURINARY: No visible inguinal bulge or swelling, at rest or with increased intra-abdominal pressure.  Additional Information   Current Outpatient Medications   Medication Sig Dispense Refill     ascorbic acid, vitamin C, (VITAMIN C) 1000 MG tablet Take 1,000 mg by mouth daily.       BETA CAROTENE ORAL Take 1 tablet by mouth daily.       CALCIUM-MAGNESIUM ORAL Take 1 tablet by mouth daily.       Lactobacillus rhamnosus GG (CULTURELLE) 10-15 Billion cell capsule Take 1 capsule by mouth daily.       omega 3-dha-epa-fish oil 500-1,000 mg cap Take 1,000 mg by mouth.       polyethylene glycol (MIRALAX) 17 gram packet Take 17 g by mouth daily as needed.       pyridoxine, vitamin B6, (VITAMIN B-6) 100 MG tablet Take 100 mg by mouth daily.       SELENIUM ORAL Take 1 tablet by mouth daily.        VITAMIN B COMPLEX ORAL Take 1 capsule by mouth.       vitamin E 400 unit capsule Take 400 Units by mouth daily.       No current facility-administered medications for this visit.      No Known Allergies  Social History     Social History Narrative     Not on file     Social History     Tobacco Use     Smoking status: Never Smoker     Smokeless tobacco: Never Used   Substance Use Topics     Alcohol use: Yes     Alcohol/week: 12.0 standard drinks     Types: 6 Cans of beer, 6 Standard drinks or equivalent per week     Drug use: Not on file     History reviewed. No pertinent family history.  No past surgical history on file.  Time: total time spent with the patient was 15 minutes of which >50% was spent in counseling and coordination of care

## 2021-06-07 NOTE — PROGRESS NOTES
"Harshad Ng is a 55 y.o. male who is being evaluated via a billable telephone visit.      The patient has been notified of following:     \"This telephone visit will be conducted via a call between you and your physician/provider. We have found that certain health care needs can be provided without the need for a physical exam.  This service lets us provide the care you need with a short phone conversation.  If a prescription is necessary we can send it directly to your pharmacy.  If lab work is needed we can place an order for that and you can then stop by our lab to have the test done at a later time.    Telephone visits are billed at different rates depending on your insurance coverage. During this emergency period, for some insurers they may be billed the same as an in-person visit.  Please reach out to your insurance provider with any questions.    If during the course of the call the physician/provider feels a telephone visit is not appropriate, you will not be charged for this service.\"    Patient has given verbal consent to a Telephone visit? Yes    Patient would like to receive their AVS by AVS Preference: Julieta.    Additional provider notes: IBS.    Constipation primarily.    Metamucil advised with docusate as needed and Culturelle.    Dizziness    Working up or trying to support 5 wife.  1 hour a day and cardio and then does some light weights.    No blood in stool or urine medication list reviewed weight stable appetite good.    Asked about paroxysmal hemicrania and discuss the pros and cons of fluoxetine which she had previously been prescribed by a neurologist.  Has undergone thorough evaluation we had a good lengthy discussion.  The patient is under added stress dealing with the coronavirus.  We did discuss ways for prevention of coronavirus 19 with good handwashing social distancing and keeping hands and fingers away from eyes nose and mouth.    Assessment/Plan:  Irritable bowel syndrome.  " Metamucil advised 1 heaping tablespoon daily plus as needed docusate plus Culturelle.  Constipated is the major symptom.    Coronavirus prevention discussed see above.    Dizziness and possible paroxysmal hemicrania or atypical migraines also discussed.  Fluoxetine as a preventive prophylactic permissible but patient is reluctant to do this he wishes to go more natural way with exercise which I endorsed +1-hour of cardio exercise each day followed by light weights and simplifying his life.  Anxiety.      Phone call duration:  21 minutes    Radha Lemus, CMA

## 2021-06-08 NOTE — PROGRESS NOTES
ASSESSMENT & PLAN:  1. URI (upper respiratory infection)  With Mild sinusitis, improved  Patient Instructions   Continue with symptomatic management: Drink plenty of fluids, take plenty of vitamin C, take Tylenol or ibuprofen as needed, rest as much as possible.  Return to clinic if symptoms persist or worsen.        No orders of the defined types were placed in this encounter.    There are no discontinued medications.    No Follow-up on file.    CHIEF COMPLAINT:  Chief Complaint   Patient presents with     Sinus Problem     sinus pressure x over 2 weeks, today pressure is gone     Headache     Nasal Congestion     runny nose       HISTORY OF PRESENT ILLNESS:  Harshad is a 51 y.o. male presenting to the clinic today complaining of sinus infection for over 2 weeks. Symptoms included headache, nasal congestion, ear pain, and tooth pain. He has history of intermittent sinus headaches that improves with Sudafed. Over the weekend, the sinus infection improved and the headache and ear fullness and pain resolved. He now has nasal drainage. One of his sons was positive for strep throat a few weeks ago.     Health Maintenance:  He received the influenza vaccine this season.    REVIEW OF SYSTEMS:   All other systems are negative.    PFSH:  Medical: He has a history of melanoma and sees a dermatologist regularly. He dislocated his left shoulder in the past.  Social: He has 4 boys.    Tobacco Use:  History   Smoking Status     Never Smoker   Smokeless Tobacco     Not on file       VITALS:  Vitals:    02/07/17 0847   BP: 128/66   Patient Site: Right Arm   Patient Position: Sitting   Cuff Size: Adult Large   Pulse: 80   Resp: 16   Temp: 98.1  F (36.7  C)   TempSrc: Oral   Weight: 182 lb (82.6 kg)   Height: 6' (1.829 m)     Wt Readings from Last 3 Encounters:   02/07/17 182 lb (82.6 kg)     Body mass index is 24.68 kg/(m^2).    PHYSICAL EXAM:  General:  Patient alert, in no acute distress.  ENT:  Hearing grossly normal.  Normal  appearance to ears and nose.  Bilateral TM s, external canals, oropharynx normal. Clear nasal drainage.  Neck:  Supple, without thyromegaly or mass, no adenopathies.  Lymphatic: Normal palpation of neck.  No lymphadenopathy.  No bruising.  Resp:  Clear to auscultation without crackles, wheezes or distress.  Normal respiratory effort.   CV:  Regular rate and rhythm without murmurs, rubs or gallops.  Neuro:  CN II-XII intact.  Psychiatric:  Alert & oriented x 3.    ADDITIONAL HISTORY SUMMARIZED (2): None.  DECISION TO OBTAIN EXTRA INFORMATION (1): Care everywhere.   RADIOLOGY TESTS (1): None.  LABS (1): None.  MEDICINE TESTS (1): None.  INDEPENDENT REVIEW (2 each): None.     The visit lasted a total of 9 minutes face to face with the patient. Over 50% of the time was spent counseling and educating the patient about sinus infection.    INadia, am scribing for and in the presence of, Dr. Rivas.    I, Dr. Rivas, personally performed the services described in this documentation, as scribed by Nadia Carranza in my presence, and it is both accurate and complete.    Dragon dictation was used for this note.  Speech recognition errors are a possibility.    MEDICATIONS:  No current outpatient prescriptions on file.     No current facility-administered medications for this visit.        Total data points: 1

## 2021-06-11 NOTE — PROGRESS NOTES
"Harshad Ng is a 55 y.o. male who is being evaluated via a billable video visit.      The patient has been notified of following:     \"This video visit will be conducted via a call between you and your physician/provider. We have found that certain health care needs can be provided without the need for an in-person physical exam.  This service lets us provide the care you need with a video conversation.  If a prescription is necessary we can send it directly to your pharmacy.  If lab work is needed we can place an order for that and you can then stop by our lab to have the test done at a later time.    Video visits are billed at different rates depending on your insurance coverage. Please reach out to your insurance provider with any questions.    If during the course of the call the physician/provider feels a video visit is not appropriate, you will not be charged for this service.\"    Patient has given verbal consent to a Video visit? Yes  How would you like to obtain your AVS? AVS Preference: MyChart.  If dropped by the video visit, the video invitation should be sent to: Text to cell phone: [phone]  Will anyone else be joining your video visit? No      Video Start Time: 9:00 AM    Additional provider notes: Sinobronchial infection try Z-Yaron plus prednisone 30 mg daily for 5 days on the prednisone.          Video-Visit Details    Type of service:  Video Visit    Video End Time (time video stopped): 9:22 AM  Originating Location (pt. Location): Home    Distant Location (provider location):  [facility] INTERNAL MEDICINE     Platform used for Video Visit: Mehnaz Romero MD  "

## 2021-06-13 NOTE — PROGRESS NOTES
"Harshad Ng is a 55 y.o. male who is being evaluated via a billable video visit.      The patient has been notified of following:     \"This video visit will be conducted via a call between you and your physician/provider. We have found that certain health care needs can be provided without the need for an in-person physical exam.  This service lets us provide the care you need with a video conversation.  If a prescription is necessary we can send it directly to your pharmacy.  If lab work is needed we can place an order for that and you can then stop by our lab to have the test done at a later time.    Video visits are billed at different rates depending on your insurance coverage. Please reach out to your insurance provider with any questions.    If during the course of the call the physician/provider feels a video visit is not appropriate, you will not be charged for this service.\"    Patient has given verbal consent to a Video visit? Yes  How would you like to obtain your AVS? AVS Preference: Mail a copy.  If dropped by the video visit, the video invitation should be sent to: Text to cell phone: [phone]  Will anyone else be joining your video visit? No      Video Start Time: 9:00 AM    Additional provider notes: Cough may be related to acid reflux not on an ACE inhibitor or postnasal drip.  Rule out asthma.  CT scan of chest pending.  Also pulmonary medicine consultation with Dr. Adelina Tucker if no better.      Video-Visit Details    Type of service:  Video Visit    Video End Time (time video stopped): 9:27 AM  Originating Location (pt. Location): Home    Distant Location (provider location):  [facility]     Platform used for Video Visit: Mehnaz Romero MD    "

## 2021-06-16 PROBLEM — R05.9 COUGH: Status: ACTIVE | Noted: 2020-09-18
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
## 2021-10-23 ENCOUNTER — HEALTH MAINTENANCE LETTER (OUTPATIENT)
Age: 56
End: 2021-10-23

## 2022-02-25 ENCOUNTER — TRANSFERRED RECORDS (OUTPATIENT)
Dept: HEALTH INFORMATION MANAGEMENT | Facility: CLINIC | Age: 57
End: 2022-02-25
Payer: COMMERCIAL

## 2022-04-08 ENCOUNTER — TRANSFERRED RECORDS (OUTPATIENT)
Dept: HEALTH INFORMATION MANAGEMENT | Facility: CLINIC | Age: 57
End: 2022-04-08
Payer: COMMERCIAL

## 2022-04-21 ENCOUNTER — VIRTUAL VISIT (OUTPATIENT)
Dept: INTERNAL MEDICINE | Facility: CLINIC | Age: 57
End: 2022-04-21
Payer: COMMERCIAL

## 2022-04-21 DIAGNOSIS — J01.90 SUBACUTE SINUSITIS, UNSPECIFIED LOCATION: Primary | ICD-10-CM

## 2022-04-21 PROCEDURE — 99213 OFFICE O/P EST LOW 20 MIN: CPT | Mod: TEL | Performed by: INTERNAL MEDICINE

## 2022-04-21 RX ORDER — VITAMIN E 268 MG
400 CAPSULE ORAL
COMMUNITY

## 2022-04-21 RX ORDER — PREDNISONE 20 MG/1
20 TABLET ORAL DAILY
Qty: 14 TABLET | Refills: 1 | Status: SHIPPED | OUTPATIENT
Start: 2022-04-21 | End: 2022-04-21

## 2022-04-21 RX ORDER — OMEGA-3/DHA/EPA/FISH OIL 200-300 MG
1000 CAPSULE ORAL
COMMUNITY

## 2022-04-21 RX ORDER — MULTIVIT WITH MINERALS/LUTEIN
1000 TABLET ORAL DAILY
COMMUNITY

## 2022-04-21 RX ORDER — PREDNISONE 20 MG/1
20 TABLET ORAL 2 TIMES DAILY
Qty: 14 TABLET | Refills: 1 | Status: SHIPPED | OUTPATIENT
Start: 2022-04-21 | End: 2022-06-07

## 2022-04-21 RX ORDER — FAMOTIDINE 20 MG
TABLET ORAL
COMMUNITY

## 2022-04-21 NOTE — PROGRESS NOTES
Harshad Ng is a 57 year oldwho is being evaluated via a billable telephone visit.       What phone number would you like to be contacted at? [phone]      Assessment & Plan  Subacute sinusitis.  Refill Augmentin 875 twice daily 7 days 1 additional refill plus prednisone 20 mg twice daily for 7 days 1 refill.  Push fluids.  Follow the advice of your nose and throat specialist as well upcoming CT scan.now sinuses pending.     11 minutes spent on the date of the encounter doing chart review, patient visit and documentation  and I spent 1 minute on the with the patient directly.       Subjective   Sinusitis headache [id number] seen at [facility] on ear nose and throat consultation.  Dr. Garland prescribed Augmentin.  CT scan of the sinus is scheduled for April 29, 2022.    Previous to ear nose and throat consultation on April 8 patient was seen at emergency room on April 2, 2022 after skiing vacation and skiing vacation followed by [place]'s vacation on April 2, 2022 using Sudafed okay now male examination done December 21, 2021 prior history of hyperlipidemia.     Review of Systems   No blood in stool or urine med list reviewed reconciled.  No blood from sinuses.       Harshad Romero MD

## 2022-04-21 NOTE — PROGRESS NOTES
"Harshad is a 57 year old who is being evaluated via a billable telephone visit.      What phone number would you like to be contacted at? [phone]  How would you like to obtain your AVS? MyChart    {PROVIDER CHARTING PREFERENCE:814827}    Subjective   Harshad is a 57 year old who presents for the following health issues {ACCOMPANIED BY STATEMENT (Optional):594876}    HPI sinus infection/ headache for one month    {SUPERLIST (Optional):854128}  {additonal problems for provider to add (Optional):575008}    Review of Systems   {ROS COMP (Optional):574837}      Objective           Vitals:  No vitals were obtained today due to virtual visit.    Physical Exam   {GENERAL APPEARANCE:50::\"healthy\",\"alert\",\"no distress\"}  PSYCH: Alert and oriented times 3; coherent speech, normal   rate and volume, able to articulate logical thoughts, able   to abstract reason, no tangential thoughts, no hallucinations   or delusions  His affect is { :9696712::\"normal\"}  RESP: No cough, no audible wheezing, able to talk in full sentences  Remainder of exam unable to be completed due to telephone visits    {Diagnostic Test Results (Optional):425008}    {AMBULATORY ATTESTATION (Optional):552399}        Phone call duration: *** minutes  "

## 2022-04-29 ENCOUNTER — TRANSFERRED RECORDS (OUTPATIENT)
Dept: HEALTH INFORMATION MANAGEMENT | Facility: CLINIC | Age: 57
End: 2022-04-29
Payer: COMMERCIAL

## 2022-05-02 ENCOUNTER — VIRTUAL VISIT (OUTPATIENT)
Dept: INTERNAL MEDICINE | Facility: CLINIC | Age: 57
End: 2022-05-02
Payer: COMMERCIAL

## 2022-05-02 DIAGNOSIS — J32.9 SINUSITIS, UNSPECIFIED CHRONICITY, UNSPECIFIED LOCATION: Primary | ICD-10-CM

## 2022-05-02 DIAGNOSIS — G44.209 TENSION HEADACHE: ICD-10-CM

## 2022-05-02 PROCEDURE — 99213 OFFICE O/P EST LOW 20 MIN: CPT | Mod: TEL | Performed by: INTERNAL MEDICINE

## 2022-05-02 RX ORDER — LEVOFLOXACIN 500 MG/1
TABLET, FILM COATED ORAL
COMMUNITY
Start: 2022-04-29 | End: 2022-06-07

## 2022-05-02 NOTE — PROGRESS NOTES
Harshad Ng is a 57 year oldwho is being evaluated via a billable telephone visit.       What phone number would you like to be contacted at? [phone]      Assessment & Plan  Sinusitis with headache has been seen by ear nose and throat specialist Dr. GUIDRY at [facility] headache is positional and is brought on with increased sinus pressure.  Suggest neurologic consultation before proceeding with ENT surgery for sinus drainage.  We had a good discussion previously the patient had been seen by Dr. Derick Wolff from neurology and I encouraged him to receive Dr. Wolff now.     11 minutes spent on the date of the encounter doing chart review, patient visit and documentation and I spoke with the patient directly 3 minutes.       Subjective   As above     Review of Systems   No blood in stool urine sputum medication list reviewed reconciled.       Harshad Romero MD

## 2022-05-10 ENCOUNTER — TRANSFERRED RECORDS (OUTPATIENT)
Dept: HEALTH INFORMATION MANAGEMENT | Facility: CLINIC | Age: 57
End: 2022-05-10
Payer: COMMERCIAL

## 2022-06-04 ENCOUNTER — HEALTH MAINTENANCE LETTER (OUTPATIENT)
Age: 57
End: 2022-06-04

## 2022-06-07 ENCOUNTER — OFFICE VISIT (OUTPATIENT)
Dept: INTERNAL MEDICINE | Facility: CLINIC | Age: 57
End: 2022-06-07
Payer: COMMERCIAL

## 2022-06-07 VITALS
RESPIRATION RATE: 16 BRPM | SYSTOLIC BLOOD PRESSURE: 118 MMHG | BODY MASS INDEX: 24.79 KG/M2 | TEMPERATURE: 99 F | HEIGHT: 72 IN | HEART RATE: 76 BPM | OXYGEN SATURATION: 97 % | WEIGHT: 183 LBS | DIASTOLIC BLOOD PRESSURE: 76 MMHG

## 2022-06-07 DIAGNOSIS — Z01.818 PREOPERATIVE EXAMINATION: Primary | ICD-10-CM

## 2022-06-07 LAB
ALBUMIN SERPL-MCNC: 4.3 G/DL (ref 3.5–5)
ALP SERPL-CCNC: 57 U/L (ref 45–120)
ALT SERPL W P-5'-P-CCNC: 33 U/L (ref 0–45)
ANION GAP SERPL CALCULATED.3IONS-SCNC: 10 MMOL/L (ref 5–18)
AST SERPL W P-5'-P-CCNC: 25 U/L (ref 0–40)
BILIRUB SERPL-MCNC: 1.1 MG/DL (ref 0–1)
BUN SERPL-MCNC: 19 MG/DL (ref 8–22)
CALCIUM SERPL-MCNC: 9.6 MG/DL (ref 8.5–10.5)
CHLORIDE BLD-SCNC: 105 MMOL/L (ref 98–107)
CHOLEST SERPL-MCNC: 225 MG/DL
CO2 SERPL-SCNC: 26 MMOL/L (ref 22–31)
CREAT SERPL-MCNC: 1.07 MG/DL (ref 0.7–1.3)
ERYTHROCYTE [DISTWIDTH] IN BLOOD BY AUTOMATED COUNT: 12.1 % (ref 10–15)
FASTING STATUS PATIENT QL REPORTED: ABNORMAL
GFR SERPL CREATININE-BSD FRML MDRD: 82 ML/MIN/1.73M2
GLUCOSE BLD-MCNC: 88 MG/DL (ref 70–125)
HCT VFR BLD AUTO: 45.5 % (ref 40–53)
HDLC SERPL-MCNC: 57 MG/DL
HGB BLD-MCNC: 15.8 G/DL (ref 13.3–17.7)
LDLC SERPL CALC-MCNC: 143 MG/DL
MCH RBC QN AUTO: 32.1 PG (ref 26.5–33)
MCHC RBC AUTO-ENTMCNC: 34.7 G/DL (ref 31.5–36.5)
MCV RBC AUTO: 93 FL (ref 78–100)
PLATELET # BLD AUTO: 201 10E3/UL (ref 150–450)
POTASSIUM BLD-SCNC: 4.4 MMOL/L (ref 3.5–5)
PROT SERPL-MCNC: 7 G/DL (ref 6–8)
RBC # BLD AUTO: 4.92 10E6/UL (ref 4.4–5.9)
SODIUM SERPL-SCNC: 141 MMOL/L (ref 136–145)
TRIGL SERPL-MCNC: 126 MG/DL
TSH SERPL DL<=0.005 MIU/L-ACNC: 1.83 UIU/ML (ref 0.3–5)
WBC # BLD AUTO: 7 10E3/UL (ref 4–11)

## 2022-06-07 PROCEDURE — 85027 COMPLETE CBC AUTOMATED: CPT | Performed by: INTERNAL MEDICINE

## 2022-06-07 PROCEDURE — 36415 COLL VENOUS BLD VENIPUNCTURE: CPT | Performed by: INTERNAL MEDICINE

## 2022-06-07 PROCEDURE — 84443 ASSAY THYROID STIM HORMONE: CPT | Performed by: INTERNAL MEDICINE

## 2022-06-07 PROCEDURE — 80061 LIPID PANEL: CPT | Performed by: INTERNAL MEDICINE

## 2022-06-07 PROCEDURE — 99214 OFFICE O/P EST MOD 30 MIN: CPT | Performed by: INTERNAL MEDICINE

## 2022-06-07 PROCEDURE — 80053 COMPREHEN METABOLIC PANEL: CPT | Performed by: INTERNAL MEDICINE

## 2022-06-07 ASSESSMENT — PAIN SCALES - GENERAL: PAINLEVEL: NO PAIN (0)

## 2022-06-07 NOTE — PROGRESS NOTES
Pre-Op Note:  Today's date: June 7, 2022    Harshad Ng is a 57 year old male who presents for a preoperative evaluation.    Surgical Information:  Surgery/Procedure: Preoperative examination anticipation of sinus surgery right side by Dr. GUIDRY June 22, 2022  fax #[phone].  Surgery Location:   Surgeon: Dr. Eliza Finley  Surgery Date: June 22, 2022  Fax number for surgical facility: [phone].    Subjective:   Preoperative examination for in anticipation of sinus surgery right side June 22, 2022 at  for this 57-year-old male with recalcitrant and persistent sinus congestion infection.  Failed conservative therapy needs surgical intervention.    Non-smoker social alcohol 14-15 drinks per week starting on Thursday.  Allergies none known no drug allergies.    Prior hernia operation double hernia childhood melanomas have been removed followed by dermatology every 6 months.    General physical examinations have been done at the [facility] on an every other year basis.  Cholesterol levels have risen from 1 90-2 40 he is not on statin therapy it would be for primary prevention.    Colonoscopy dated December 6, 2017 showed a hyperplastic polyp Dr. Moyer.    ROS:   14 point review of systems negative in its entirety.    Medications:     Current Outpatient Medications:      Omega-3 Fatty Acids (ULTRA OMEGA 3) 1000 MG CAPS, Take 1,000 mg by mouth, Disp: , Rfl:      pyridOXINE (VITAMIN B6) 100 MG TABS, Take 100 mg by mouth, Disp: , Rfl:      selenium 50 MCG TABS tablet, Take 50 mcg by mouth daily, Disp: , Rfl:      vitamin B-Complex, Take 1 tablet by mouth daily, Disp: , Rfl:      vitamin C (ASCORBIC ACID) 1000 MG TABS, Take 1,000 mg by mouth daily, Disp: , Rfl:      vitamin E (TOCOPHEROL) 400 units (180 mg) capsule, Take 400 Units by mouth, Disp: , Rfl:      Lactobacillus Rhamnosus, GG, (RA PROBIOTIC DIGESTIVE CARE) CAPS, Take 1 capsule by mouth daily (Patient not taking:  "Reported on 2022), Disp: , Rfl:      Vitamin D (Cholecalciferol) 25 MCG (1000 UT) CAPS, , Disp: , Rfl:      Allergies:  No Known Allergies    Immunizations:   Immunization History   Administered Date(s) Administered     COVID-19,PF,Humberto 04/10/2021     Flu, Unspecified 2005, 10/01/2009, 10/16/2012, 12/10/2013, 10/09/2019     Td,adult,historic,unspecified 01/15/2004     Tdap (Adacel,Boostrix) 2010, 2017     Immunizations reviewed and up-to-date.    Health Maintenance:   Immunizations reviewed and up-to-date colonoscopy as noted above negative for cancer 2017 Dr. Moyer [facility].    Past Medical History:   Past Medical History:   Diagnosis Date     Cancer (H)     skin   Hyperlipidemia without target organ damage related to same.  Melanomas have been removed perhaps 3 in number.    Past Surgical History:   Past Surgical History:   Procedure Laterality Date     HERNIA REPAIR       SOFT TISSUE SURGERY        Double hernia repair in childhood.  No anesthetic complications.  Family History:   Father  90.    Mother living and well age 85; 4 sons well wife well.    There is no family history of malignant hyperthermia associated with general anesthesia.      Exam:  Vitals:/76 (BP Location: Right arm, Patient Position: Sitting)   Pulse 76   Temp 99  F (37.2  C)   Resp 16   Ht 1.816 m (5' 11.5\")   Wt 83 kg (183 lb)   SpO2 97%   BMI 25.17 kg/m    Easily conversant highly intelligent not in acute distress neatly attired neck veins nondistended no thyromegaly no carotid bruits back straight no severe spine tenderness chest clear works out regularly 3-4 times per week neuromuscular tone is good heart tones regular rhythm without murmur rub or gallop abdomen benign without organomegaly masses or tenderness noted extremities free of edema cyanosis or clubbing.  He appears well not acutely ill or chronically ill.    Assessment and Plan:  Medically acceptable risk for anticipated " sinus surgery right side June 22, 2022 at [facility] by Dr. Eliza Finley.    Harshad Romero MD

## 2022-06-07 NOTE — PROGRESS NOTES
[address] [address]  Phone: [phone]  Fax: [phone]  Primary Provider: Harshad Romero  {FV AMB Performing Provider (Optional):321044}    {Provider  Link to PREOP SmartSet  Use this to apply standard patient instructions to AVS; includes medication directions, common orders, guidelines for anemia, warfarin, additional testing   :359587}  PREOPERATIVE EVALUATION:  Today's date: 6/7/2022    Harshad Ng is a 57 year old male who presents for a preoperative evaluation.    Surgical Information:  Surgery/Procedure: Right sphenoidotomy and bilateral ethmoidectomies  Surgery Location: [facility]  Surgeon: Dr. Agustina Finley  Surgery Date: 06/22/2022  Time of Surgery: ***  Where patient plans to recover: At home with family  Fax number for surgical facility: {SURGERY FAX NUMBER:[phone]}    Type of Anesthesia Anticipated: {ANESTHESIA:737086}    {2021 Provider Charting Preference for Preop :654238}    Subjective     HPI related to upcoming procedure: ***    Preop Questions 6/7/2022   1. Have you ever had a heart attack or stroke? No   2. Have you ever had surgery on your heart or blood vessels, such as a stent placement, a coronary artery bypass, or surgery on an artery in your head, neck, heart, or legs? No   3. Do you have chest pain with activity? No   4. Do you have a history of  heart failure? No   5. Do you currently have a cold, bronchitis or symptoms of other infection? No   6. Do you have a cough, shortness of breath, or wheezing? No   7. Do you or anyone in your family have previous history of blood clots? No   8. Do you or does anyone in your family have a serious bleeding problem such as prolonged bleeding following surgeries or cuts? No   9. Have you ever had problems with anemia or been told to take iron pills? No   10. Have you had any abnormal blood loss such as black, tarry or bloody stools? No   11. Have you ever  had a blood transfusion? No   12. Are you willing to have a blood transfusion if it is medically needed before, during, or after your surgery? Yes   13. Have you or any of your relatives ever had problems with anesthesia? No   14. Do you have sleep apnea, excessive snoring or daytime drowsiness? No   15. Do you have any artifical heart valves or other implanted medical devices like a pacemaker, defibrillator, or continuous glucose monitor? No   16. Do you have artificial joints? No   17. Are you allergic to latex? No       Health Care Directive:  Patient does not have a Health Care Directive or Living Will: {ADVANCE_DIRECTIVE_STATUS:435706}    Preoperative Review of :  {Mnpmpreport:413541}  {Review MNPMP for all patients per [employer]CSI MNPMP Profile:582324}    {Chronic problem details (Optional) :412576}    Review of Systems  {ROS Preop Choices:587504}    Patient Active Problem List    Diagnosis Date Noted     Cough 09/18/2020     Priority: Medium      Past Medical History:   Diagnosis Date     Cancer (H)     skin     Past Surgical History:   Procedure Laterality Date     HERNIA REPAIR       SOFT TISSUE SURGERY       Current Outpatient Medications   Medication Sig Dispense Refill     amoxicillin-clavulanate (AUGMENTIN) 875-125 MG tablet Take 1 tablet by mouth 2 times daily 14 tablet 1     Lactobacillus Rhamnosus, GG, (RA PROBIOTIC DIGESTIVE CARE) CAPS Take 1 capsule by mouth daily       levofloxacin (LEVAQUIN) 500 MG tablet TAKE 1 TABLET BY MOUTH DAILY FOR 10 DAYS       Omega-3 Fatty Acids (ULTRA OMEGA 3) 1000 MG CAPS Take 1,000 mg by mouth       predniSONE (DELTASONE) 20 MG tablet Take 1 tablet (20 mg) by mouth 2 times daily 14 tablet 1     pyridOXINE (VITAMIN B6) 100 MG TABS Take 100 mg by mouth       selenium 50 MCG TABS tablet Take 50 mcg by mouth daily       vitamin C (ASCORBIC ACID) 1000 MG TABS Take 1,000 mg by mouth daily       Vitamin D (Cholecalciferol) 25 MCG (1000 UT) CAPS        vitamin E (TOCOPHEROL) 400  "units (180 mg) capsule Take 400 Units by mouth         No Known Allergies     Social History     Tobacco Use     Smoking status: Never Smoker     Smokeless tobacco: Never Used   Substance Use Topics     Alcohol use: Yes     Alcohol/week: 12.0 standard drinks     {FAMILY HISTORY (Optional):905356470}  History   Drug Use Not on file         Objective     There were no vitals taken for this visit.    Physical Exam  {EXAM Preop Choices:380210}    No results for input(s): HGB, PLT, INR, NA, POTASSIUM, CR, A1C in the last 85739 hours.     Diagnostics:  {LABS:991107}   {EK}    Revised Cardiac Risk Index (RCRI):  The patient has the following serious cardiovascular risks for perioperative complications:  {PREOP REVISED CARDIAC RISK INDEX (RCRI) :991992::\" - No serious cardiac risks = 0 points\"}     RCRI Interpretation: {REVISED CARDIAC RISK INTERPRETATION :461662}         Signed Electronically by: Harshad Romero MD  Copy of this evaluation report is provided to requesting physician.    {Provider Resources  Preop [facility] Preop Guidelines  Revised Cardiac Risk Index :329830}  "

## 2022-06-07 NOTE — LETTER
June 9, 2022      Lamont Ng  [address] [address]        Dear ,    We are writing to inform you of your test results.    Preop  Lipids are too high and would suggest rosuvastatin 5 mg tablets dispense number 90 tablets take 1 tablet daily with 3 refills.     Resulted Orders   CBC with platelets   Result Value Ref Range    WBC Count 7.0 4.0 - 11.0 10e3/uL    RBC Count 4.92 4.40 - 5.90 10e6/uL    Hemoglobin 15.8 13.3 - 17.7 g/dL    Hematocrit 45.5 40.0 - 53.0 %    MCV 93 78 - 100 fL    MCH 32.1 26.5 - 33.0 pg    MCHC 34.7 31.5 - 36.5 g/dL    RDW 12.1 10.0 - 15.0 %    Platelet Count 201 150 - 450 10e3/uL   Comprehensive metabolic panel   Result Value Ref Range    Sodium 141 136 - 145 mmol/L    Potassium 4.4 3.5 - 5.0 mmol/L    Chloride 105 98 - 107 mmol/L    Carbon Dioxide (CO2) 26 22 - 31 mmol/L    Anion Gap 10 5 - 18 mmol/L    Urea Nitrogen 19 8 - 22 mg/dL    Creatinine 1.07 0.70 - 1.30 mg/dL    Calcium 9.6 8.5 - 10.5 mg/dL    Glucose 88 70 - 125 mg/dL    Alkaline Phosphatase 57 45 - 120 U/L    AST 25 0 - 40 U/L    ALT 33 0 - 45 U/L    Protein Total 7.0 6.0 - 8.0 g/dL    Albumin 4.3 3.5 - 5.0 g/dL    Bilirubin Total 1.1 (H) 0.0 - 1.0 mg/dL    GFR Estimate 82 >60 mL/min/1.73m2      Comment:      Effective December 21, 2021 eGFRcr in adults is calculated using the 2021 CKD-EPI creatinine equation which includes age and gender (Tara et al., NEJM, DOI: 10.1056/XJUIez6018082)   Lipid panel reflex to direct LDL Fasting   Result Value Ref Range    Cholesterol 225 (H) <=199 mg/dL    Triglycerides 126 <=149 mg/dL    Direct Measure HDL 57 >=40 mg/dL      Comment:      HDL Cholesterol Reference Range:     0-2 years:   No reference ranges established for patients under 2 years old  at [facility] for lipid analytes.    2-8 years:  Greater than 45 mg/dL     18 years and older:   Female: Greater than or equal to 50 mg/dL   Male:   Greater than or equal to 40 mg/dL    LDL  Cholesterol Calculated 143 (H) <=129 mg/dL    Patient Fasting > 8hrs? Unknown    TSH   Result Value Ref Range    TSH 1.83 0.30 - 5.00 uIU/mL       If you have any questions or concerns, please call the clinic at the number listed above.       Sincerely,      Harshad Romero MD

## 2022-06-09 DIAGNOSIS — E78.5 HYPERLIPIDEMIA LDL GOAL <130: Primary | ICD-10-CM

## 2022-06-09 RX ORDER — ROSUVASTATIN CALCIUM 5 MG/1
5 TABLET, COATED ORAL DAILY
Qty: 90 TABLET | Refills: 3 | Status: SHIPPED | OUTPATIENT
Start: 2022-06-09 | End: 2023-05-11

## 2022-06-17 ENCOUNTER — LAB (OUTPATIENT)
Dept: FAMILY MEDICINE | Facility: CLINIC | Age: 57
End: 2022-06-17
Attending: INTERNAL MEDICINE
Payer: COMMERCIAL

## 2022-06-17 DIAGNOSIS — Z01.818 PREOPERATIVE EXAMINATION: ICD-10-CM

## 2022-06-17 LAB — SARS-COV-2 RNA RESP QL NAA+PROBE: NEGATIVE

## 2022-06-17 PROCEDURE — U0003 INFECTIOUS AGENT DETECTION BY NUCLEIC ACID (DNA OR RNA); SEVERE ACUTE RESPIRATORY SYNDROME CORONAVIRUS 2 (SARS-COV-2) (CORONAVIRUS DISEASE [COVID-19]), AMPLIFIED PROBE TECHNIQUE, MAKING USE OF HIGH THROUGHPUT TECHNOLOGIES AS DESCRIBED BY CMS-2020-01-R: HCPCS

## 2022-06-17 PROCEDURE — U0005 INFEC AGEN DETEC AMPLI PROBE: HCPCS

## 2022-06-28 ENCOUNTER — TRANSFERRED RECORDS (OUTPATIENT)
Dept: HEALTH INFORMATION MANAGEMENT | Facility: CLINIC | Age: 57
End: 2022-06-28

## 2022-07-13 ENCOUNTER — TRANSFERRED RECORDS (OUTPATIENT)
Dept: HEALTH INFORMATION MANAGEMENT | Facility: CLINIC | Age: 57
End: 2022-07-13

## 2022-08-23 ENCOUNTER — TRANSFERRED RECORDS (OUTPATIENT)
Dept: HEALTH INFORMATION MANAGEMENT | Facility: CLINIC | Age: 57
End: 2022-08-23

## 2022-08-29 ENCOUNTER — TRANSFERRED RECORDS (OUTPATIENT)
Dept: HEALTH INFORMATION MANAGEMENT | Facility: CLINIC | Age: 57
End: 2022-08-29

## 2022-08-30 ENCOUNTER — TELEPHONE (OUTPATIENT)
Dept: NEUROLOGY | Facility: CLINIC | Age: 57
End: 2022-08-30

## 2022-09-14 ASSESSMENT — HEADACHE IMPACT TEST (HIT 6)
HOW OFTEN DID HEADACHS LIMIT CONCENTRATION ON WORK OR DAILY ACTIVITY: VERY OFTEN
WHEN YOU HAVE HEADACHES HOW OFTEN IS THE PAIN SEVERE: VERY OFTEN
HOW OFTEN DO HEADACHES LIMIT YOUR DAILY ACTIVITIES: RARELY
WHEN YOU HAVE A HEADACHE HOW OFTEN DO YOU WISH YOU COULD LIE DOWN: SOMETIMES
HIT6 TOTAL SCORE: 59
HOW OFTEN HAVE YOU FELT FED UP OR IRRITATED BECAUSE OF YOUR HEADACHES: VERY OFTEN
HOW OFTEN HAVE YOU FELT TOO TIRED TO WORK BECAUSE OF YOUR HEADACHES: RARELY

## 2022-09-14 NOTE — PROGRESS NOTES
INITIAL NEUROLOGY CONSULTATION    DATE OF VISIT: 9/15/2022  CLINIC LOCATION: [facility]  MRN: [id number]  PATIENT NAME: Harshad Ng  YOB: 1965    REASON FOR VISIT: No chief complaint on file.    HISTORY OF PRESENT ILLNESS:                                                    Mr. Harshad Ng is 57 year old right handed male patient with past medical history of skin cancer, who was seen today for headache.    Per patient's report, ***.    According to scanned report, the patient was seen by Dr. Wolff ([facility]) in March 2020 regarding facial paresthesias (with initial evaluation dating back in December 2019).  Work-up at that time included brain MRI (see results below), labs (folate, vitamin B12, TSH, methylmalonic acid, Lyme serology, and SYLVIA) and EEG, which were normal.    Laboratory evaluation from June 2022 is notable for elevated total bilirubin (1.1) and LDL of 143.  His TSH (1.83), electrolytes/creatinine, and CBC were normal, and COVID-19 PCR was negative at that time.  Vitamin B12 was normal in February 2020 (478).    Brain MRI with and without contrast from 5/9/2018 was read as normal. Brain MRI without contrast from 11/7/2019 demonstrated minimal nonspecific white matter changes.  Retrospectively, they were present on study from 2018 according to my personal review of images and independent interpretation.    No additional useful information is available in Care Everywhere, which was reviewed.  PAST MEDICAL/SURGICAL HISTORY:                                                    I personally reviewed patient's past medical and surgical history with the patient at today's visit.  MEDICATIONS:                                                    I personally reviewed patient's medications and allergies with the patient at today's visit.  ALLERGIES:                                                    No Known Allergies  EXAM:                                                     VITAL SIGNS:   There were no vitals taken for this visit.  Mini-Cog Assessment:       General: pt is in NAD, cooperative.  Skin: normal turgor, moist mucous membranes, no lesions/rashes noticed.  HEENT: ATNC, EOMI, PERRL, white sclera, normal conjunctiva, no nystagmus or ptosis. No carotid bruits bilaterally.  Respiratory: lung sounds clear to auscultation bilaterally, no crackles, wheezes, rhonchi. Symmetric lung excursion, no accessory respiratory muscle use.  Cardiovascular: normal S1/S2, no murmurs/rubs/gallops.   Abdomen: Not distended.  : deferred.    Neurological:  Mental: alert, follows commands,  /5 with ***/3 on memory recall, no aphasia or dysarthria. Fund of knowledge is {MYAPPROPRIATE:069348}  Cranial Nerves:  CN II: visual acuity - able to accurately count fingers with each eye. Visual fields intact, fundi: discs sharp, no papilledema and normal vessels bilaterally.  CN III, IV, VI: EOM intact, pupils equal and reactive  CN V: facial sensation nl  CN VII: face symmetric, no facial droop  CN VIII: hearing normal  CN IX: palate elevation symmetric, uvula at midline  CN XI SCM normal, shoulder shrug nl  CN XII: tongue midline  Motor: Strength: 5/5 in all major groups of all extremities. Normal tone. No abnormal movements. No pronator drift b/l.  Reflexes: Triceps, biceps, brachioradialis, patellar, and achilles reflexes normal and symmetric. No clonus noted. Toes are down-going b/l.   Sensory: light touch, pinprick, and vibration intact. Romberg: negative.  Coordination: FNF and heel-shin tests intact b/l.   Gait:  Normal, able to tandem walk *** without difficulty.  DATA:   LABS/EEG/IMAGING/OTHER STUDIES: I reviewed pertinent medical records, as detailed in the history of present illness.  ASSESSMENT AND PLAN:      ASSESSMENT: Harshad Ng is a 57 year old male patient with listed above past medical history, who presents with ***.    We had a detailed discussion with  the patient regarding his presenting complaints.  The neurological exam today is ***.    DIAGNOSES:  No diagnosis found.  PLAN: There are no Patient Instructions on file for this visit.    Total Time: *** minutes spent on the date of the encounter doing chart review, history and exam, documentation and further activities per the note.    Gigi Agosto MD  [facility]  (Chart documentation was completed in part with Dragon voice-recognition software. Even though reviewed, some grammatical, spelling, and word errors may remain.)

## 2022-09-15 ENCOUNTER — OFFICE VISIT (OUTPATIENT)
Dept: NEUROLOGY | Facility: CLINIC | Age: 57
End: 2022-09-15
Payer: COMMERCIAL

## 2022-09-15 VITALS
OXYGEN SATURATION: 99 % | DIASTOLIC BLOOD PRESSURE: 74 MMHG | WEIGHT: 184 LBS | BODY MASS INDEX: 24.92 KG/M2 | HEART RATE: 70 BPM | HEIGHT: 72 IN | SYSTOLIC BLOOD PRESSURE: 131 MMHG

## 2022-09-15 DIAGNOSIS — G44.83 COUGH HEADACHE: Primary | ICD-10-CM

## 2022-09-15 PROCEDURE — 99205 OFFICE O/P NEW HI 60 MIN: CPT | Performed by: PSYCHIATRY & NEUROLOGY

## 2022-09-15 NOTE — PATIENT INSTRUCTIONS
AFTER VISIT SUMMARY (AVS):    At today's visit we thoroughly discussed various diagnostic possibilities for your symptoms, necessary evaluation, and the plan, which includes:  Orders Placed This Encounter   Procedures    MR Brain w/o Contrast    MRA Head w/o Contrast Angiogram     No new medications, but we discussed treatment options of indomethacin and propranolol if imaging is unrevealing.    Next follow-up appointment is in the next 4 weeks or earlier if needed.    Please do not hesitate to call me with any questions or concerns.    Thanks.

## 2022-09-15 NOTE — LETTER
9/15/2022         RE: Harshad Ng  [address] [facility] [address]        Dear Colleague,    Thank you for referring your patient, Harshad Ng, to the [facility] NEUROLOGY [facility]. Please see a copy of my visit note below.    INITIAL NEUROLOGY CONSULTATION    DATE OF VISIT: 9/15/2022  CLINIC LOCATION: [facility]  MRN: [id number]  PATIENT NAME: Harshad Ng  YOB: 1965    REASON FOR VISIT:   Chief Complaint   Patient presents with     Headache     Started in Feb. 2022     HISTORY OF PRESENT ILLNESS:                                                    Mr. Harshad Ng is 57 year old right handed male patient with past medical history of skin cancer and sinusitis, who was seen today for headache.    Per patient's report, he developed persistent headaches since February 2022.  It felt similar to his usual sinus related headaches.  He treated symptomatically with Aleve, ibuprofen, and Tylenol.  Eventually he was seen by ENT specialist and underwent sinus surgery (sphenoid and ethmoid sinuses).  Symptoms improved, but then he had a cracked tooth that required tooth extraction in August that also helped.  However, his headache of lesser intensity still continues.    At the present time, he reports intermittent pressure headache that affects his bitemporal/bifrontal and top of the head regions that usually occurs after he coughs, sneezes, yells, has bowel movement or with quick bending down movements.  It is intense for 2 to 3 minutes and then could linger for up to an hour at 4/10 intensity.  It might happen up to 3-4 times per day.  He exercises regularly, 4 times per week, but did not notice it during that time.  Occasionally, he needs to take ibuprofen or Tylenol, but does not take it frequently.  He denies any additional associated complaints or focal neurological symptoms.    Family history is positive for seizures/epilepsy (sister)  "and Parkinson's disease (maternal grandfather).    According to scanned report, the patient was seen by Dr. Wolff ([facility]) in March 2020 regarding facial paresthesias (with initial evaluation dating back in December 2019).  Work-up at that time included brain MRI (see results below), labs (folate, vitamin B12, TSH, methylmalonic acid, Lyme serology, and SYLVIA) and EEG, which were normal.  He was also seen at [facility] by a neurologist, but no cause of his symptoms were found.    Laboratory evaluation from June 2022 is notable for elevated total bilirubin (1.1) and LDL of 143.  His TSH (1.83), electrolytes/creatinine, and CBC were normal, and COVID-19 PCR was negative at that time.  Vitamin B12 was normal in February 2020 (478).    Brain MRI with and without contrast from 5/9/2018 was read as normal. Brain MRI without contrast from 11/7/2019 demonstrated minimal nonspecific white matter changes.  Retrospectively, they were present on study from 2018 according to my personal review of images and independent interpretation.    No additional useful information is available in Care Everywhere, which was reviewed.  PAST MEDICAL/SURGICAL HISTORY:                                                    I personally reviewed patient's past medical and surgical history with the patient at today's visit.  MEDICATIONS:                                                    I personally reviewed patient's medications and allergies with the patient at today's visit.  ALLERGIES:                                                    No Known Allergies  EXAM:                                                    VITAL SIGNS:   /74   Pulse 70   Ht 1.816 m (5' 11.5\")   Wt 83.5 kg (184 lb)   SpO2 99%   BMI 25.31 kg/m    Mini-Cog Assessment:  Mini Cog Assessment  Clock Draw Score: 2 Normal  3 Item Recall: 3 objects recalled  Mini Cog Total Score: 5    General: pt is in NAD, cooperative.  Skin: normal turgor, " moist mucous membranes, no lesions/rashes noticed.  HEENT: ATNC, EOMI, PERRL, white sclera, normal conjunctiva, no nystagmus or ptosis. No carotid bruits bilaterally.  Respiratory: lung sounds clear to auscultation bilaterally, no crackles, wheezes, rhonchi. Symmetric lung excursion, no accessory respiratory muscle use.  Cardiovascular: normal S1/S2, no murmurs/rubs/gallops.   Abdomen: Not distended.  : deferred.    Neurological:  Mental: alert, follows commands, Mini Cog Total Score: 5/5 with 3/3 on memory recall, no aphasia or dysarthria. Fund of knowledge is appropriate for age.  Cranial Nerves:  CN II: visual acuity - able to accurately count fingers with each eye. Visual fields intact, fundi: discs sharp, no papilledema and normal vessels bilaterally.  CN III, IV, VI: EOM intact, pupils equal and reactive  CN V: facial sensation nl  CN VII: face symmetric, no facial droop  CN VIII: hearing normal  CN IX: palate elevation symmetric, uvula at midline  CN XI SCM normal, shoulder shrug nl  CN XII: tongue midline  Motor: Strength: 5/5 in all major groups of all extremities. Normal tone. No abnormal movements. No pronator drift b/l.  Reflexes: Triceps, biceps, brachioradialis, patellar, and achilles reflexes normal and symmetric. No clonus noted. Toes are down-going b/l.   Sensory: light touch, pinprick, and vibration intact. Romberg: negative.  Coordination: FNF and heel-shin tests intact b/l.   Gait:  Normal, able to tandem walk without difficulty.  DATA:   LABS/EEG/IMAGING/OTHER STUDIES: I reviewed pertinent medical records, as detailed in the history of present illness.  ASSESSMENT AND PLAN:      ASSESSMENT: Harshad Ng is a 57 year old male patient with listed above past medical history, who presents with cough/Valsalva maneuver related headaches since February 2022.    We had a detailed discussion with the patient regarding his presenting complaints.  The neurological exam today is non-focal.  His  previous brain imaging was unrevealing, but headaches started after his last brain MRI.      We discussed that clinical presentation might be consistent with primary cough headache, but could also have secondary causes, such as structural brain lesions and cerebral aneurysms.  The likelihood is low, but not totally excluded.  For that reason, we decided to start with brain MRI and head MRA to evaluate for serious causes.  If negative, we will consider symptomatic treatment with indomethacin or propranolol.  Naproxen or gabapentin could also be tried.    DIAGNOSES:    ICD-10-CM    1. Cough headache  G44.83 Adult Neurology  Referral     MR Brain w/o Contrast     MRA Head w/o Contrast Angiogram     PLAN: At today's visit we thoroughly discussed various diagnostic possibilities for patient's symptoms, necessary evaluation, and the plan, which includes:  Orders Placed This Encounter   Procedures     MR Brain w/o Contrast     MRA Head w/o Contrast Angiogram     No new medications, but we discussed treatment options of indomethacin and propranolol if imaging is unrevealing.    Next follow-up appointment is in the next 4 weeks or earlier if needed.    Total Time: 63 minutes spent on the date of the encounter doing chart review, history and exam, documentation and further activities per the note.    Gigi Agosto MD  [facility] Neurology  (Chart documentation was completed in part with Dragon voice-recognition software. Even though reviewed, some grammatical, spelling, and word errors may remain.)            Again, thank you for allowing me to participate in the care of your patient.        Sincerely,        Gigi Agosto MD

## 2022-09-26 ENCOUNTER — HOSPITAL ENCOUNTER (OUTPATIENT)
Dept: MRI IMAGING | Facility: CLINIC | Age: 57
Discharge: HOME | End: 2022-09-26
Attending: PSYCHIATRY & NEUROLOGY
Payer: COMMERCIAL

## 2022-09-26 DIAGNOSIS — G44.83 COUGH HEADACHE: ICD-10-CM

## 2022-09-26 PROCEDURE — 70551 MRI BRAIN STEM W/O DYE: CPT

## 2022-09-26 PROCEDURE — 70544 MR ANGIOGRAPHY HEAD W/O DYE: CPT | Mod: XS

## 2022-10-03 ENCOUNTER — OFFICE VISIT (OUTPATIENT)
Dept: NEUROLOGY | Facility: CLINIC | Age: 57
End: 2022-10-03
Payer: COMMERCIAL

## 2022-10-03 VITALS — DIASTOLIC BLOOD PRESSURE: 82 MMHG | HEART RATE: 71 BPM | SYSTOLIC BLOOD PRESSURE: 142 MMHG

## 2022-10-03 DIAGNOSIS — G44.83 PRIMARY COUGH HEADACHE: Primary | ICD-10-CM

## 2022-10-03 PROCEDURE — 99214 OFFICE O/P EST MOD 30 MIN: CPT | Performed by: PSYCHIATRY & NEUROLOGY

## 2022-10-03 RX ORDER — INDOMETHACIN 25 MG/1
CAPSULE ORAL
Qty: 120 CAPSULE | Refills: 3 | Status: SHIPPED | OUTPATIENT
Start: 2022-10-03 | End: 2023-01-09

## 2022-10-03 NOTE — LETTER
10/3/2022         RE: Harshad Ng  [address] [address]        Dear Colleague,    Thank you for referring your patient, Harshad Ng, to the [facility] NEUROLOGY CLINICS [facility]. Please see a copy of my visit note below.    ESTABLISHED PATIENT NEUROLOGY NOTE    DATE OF VISIT: 10/3/2022  CLINIC LOCATION: [facility]  MRN: [id number]  PATIENT NAME: Harshad Ng  YOB: 1965    REASON FOR VISIT:   Chief Complaint   Patient presents with     Results     SUBJECTIVE:                                                      HISTORY OF PRESENT ILLNESS: Patient is here to follow up regarding cough headache after the completion of recommended work-up. Please refer to my initial note from 9/15/2022 for further information.    Since the last visit, the patient reports no changes in his symptoms.  He denies interval development of new focal neurological symptoms.    Head MRA from 9/26/2022 was normal.  Brain MRI demonstrated mucosal thickening of the right sphenoid sinus, but no other abnormalities.  Images were personally reviewed and independently interpreted.  EXAM:                                                    Physical Exam:   Vitals: BP (!) 142/82   Pulse 70     General: pt is in NAD, cooperative.  Skin: normal turgor, moist mucous membranes, no lesions/rashes noticed.  HEENT: ATNC, white sclera, normal conjunctiva.  Respiratory: Symmetric lung excursion, no accessory respiratory muscle use.  Abdomen: Non distended.  Neurological: awake, cooperative, follows commands, no aphasia or dysarthria noted, cranial nerves II-XII: no ptosis, face is symmetric, equally moves all extremities, no dysmetria bilaterally, casual gait is normal.  ASSESSMENT AND PLAN:                                                    Assessment: 57 year old male patient presents for follow-up of cough/Valsalva maneuver related headaches since February 2022 after the  completion of recommended imaging (brain MRI and head MRA), which was unrevealing.  We reviewed images and discussed results together.  Most likely his clinical presentation would be consistent with primary cough headache that usually responds to indomethacin or propranolol.  Naproxen or gabapentin could also be tried.  We reviewed treatment options and decided to try indomethacin.    Lamont to follow up with Primary Care provider regarding elevated blood pressure.    Diagnoses:    ICD-10-CM    1. Primary cough headache  G44.83      Plan: At today's visit we thoroughly discussed current symptoms, MRI results, available treatment options, and the plan.    We decided to try indomethacin next.  I advised the patient to take 25 mg with evening meal for 1 week, then take 25 mg twice daily for 1 week.  After that take 25 mg in the morning and 50 mg in the evening for 1 week.  Take 50 mg twice daily with meals thereafter and with any refills.    Next follow-up appointment is in the next 3 months or earlier if needed.    Total Time: 31 minutes spent on the date of the encounter doing chart review, history and exam, documentation and further activities per the note.    Gigi Agosto MD  [facility] Neurology  (Chart documentation was completed in part with Dragon voice-recognition software. Even though reviewed, some grammatical, spelling, and word errors may remain.)                  Again, thank you for allowing me to participate in the care of your patient.        Sincerely,        Gigi Agosto MD

## 2022-10-03 NOTE — PATIENT INSTRUCTIONS
AFTER VISIT SUMMARY (AVS):    At today's visit we thoroughly discussed current symptoms, MRI results, available treatment options, and the plan.    We decided to try indomethacin next.  Please take 25 mg with evening meal for 1 week, then take 25 mg twice daily for 1 week.  After that take 25 mg in the morning and 50 mg in the evening for 1 week.  Take 50 mg twice daily with meals thereafter and with any refills.    Next follow-up appointment is in the next 3 months or earlier if needed.    Please do not hesitate to call me with any questions or concerns.    Thanks.

## 2022-10-03 NOTE — PROGRESS NOTES
ESTABLISHED PATIENT NEUROLOGY NOTE    DATE OF VISIT: 10/3/2022  CLINIC LOCATION: [facility]  MRN: [id number]  PATIENT NAME: Harshad Ng  YOB: 1965    REASON FOR VISIT:   Chief Complaint   Patient presents with     Results     SUBJECTIVE:                                                      HISTORY OF PRESENT ILLNESS: Patient is here to follow up regarding cough headache after the completion of recommended work-up. Please refer to my initial note from 9/15/2022 for further information.    Since the last visit, the patient reports no changes in his symptoms.  He denies interval development of new focal neurological symptoms.    Head MRA from 9/26/2022 was normal.  Brain MRI demonstrated mucosal thickening of the right sphenoid sinus, but no other abnormalities.  Images were personally reviewed and independently interpreted.  EXAM:                                                    Physical Exam:   Vitals: BP (!) 142/82   Pulse 70     General: pt is in NAD, cooperative.  Skin: normal turgor, moist mucous membranes, no lesions/rashes noticed.  HEENT: ATNC, white sclera, normal conjunctiva.  Respiratory: Symmetric lung excursion, no accessory respiratory muscle use.  Abdomen: Non distended.  Neurological: awake, cooperative, follows commands, no aphasia or dysarthria noted, cranial nerves II-XII: no ptosis, face is symmetric, equally moves all extremities, no dysmetria bilaterally, casual gait is normal.  ASSESSMENT AND PLAN:                                                    Assessment: 57 year old male patient presents for follow-up of cough/Valsalva maneuver related headaches since February 2022 after the completion of recommended imaging (brain MRI and head MRA), which was unrevealing.  We reviewed images and discussed results together.  Most likely his clinical presentation would be consistent with primary cough headache that usually responds to indomethacin or propranolol.   Naproxen or gabapentin could also be tried.  We reviewed treatment options and decided to try indomethacin.    Lamont to follow up with Primary Care provider regarding elevated blood pressure.    Diagnoses:    ICD-10-CM    1. Primary cough headache  G44.83      Plan: At today's visit we thoroughly discussed current symptoms, MRI results, available treatment options, and the plan.    We decided to try indomethacin next.  I advised the patient to take 25 mg with evening meal for 1 week, then take 25 mg twice daily for 1 week.  After that take 25 mg in the morning and 50 mg in the evening for 1 week.  Take 50 mg twice daily with meals thereafter and with any refills.    Next follow-up appointment is in the next 3 months or earlier if needed.    Total Time: 31 minutes spent on the date of the encounter doing chart review, history and exam, documentation and further activities per the note.    Gigi Agosto MD  [facility] Neurology  (Chart documentation was completed in part with Dragon voice-recognition software. Even though reviewed, some grammatical, spelling, and word errors may remain.)

## 2022-10-10 ENCOUNTER — HEALTH MAINTENANCE LETTER (OUTPATIENT)
Age: 57
End: 2022-10-10

## 2023-01-09 ENCOUNTER — OFFICE VISIT (OUTPATIENT)
Dept: NEUROLOGY | Facility: CLINIC | Age: 58
End: 2023-01-09
Payer: COMMERCIAL

## 2023-01-09 VITALS — HEART RATE: 77 BPM | SYSTOLIC BLOOD PRESSURE: 131 MMHG | DIASTOLIC BLOOD PRESSURE: 88 MMHG | OXYGEN SATURATION: 96 %

## 2023-01-09 DIAGNOSIS — G44.83 PRIMARY COUGH HEADACHE: Primary | ICD-10-CM

## 2023-01-09 PROCEDURE — 99213 OFFICE O/P EST LOW 20 MIN: CPT | Performed by: PSYCHIATRY & NEUROLOGY

## 2023-01-09 RX ORDER — INDOMETHACIN 50 MG/1
50 CAPSULE ORAL
Qty: 90 CAPSULE | Refills: 3 | Status: SHIPPED | OUTPATIENT
Start: 2023-01-09 | End: 2023-05-11

## 2023-01-09 NOTE — LETTER
1/9/2023         RE: [facility]arshad Ng  [address] [address]        Dear Colleague,    Thank you for referring your patient, Harshad Ng, to the [facility] NEUROLOGY CLINICS [facility]. Please see a copy of my visit note below.    ESTABLISHED PATIENT NEUROLOGY NOTE    DATE OF VISIT: 1/9/2023  CLINIC LOCATION: [facility]  MRN: [id number]  PATIENT NAME: Harshad Ng  YOB: 1965    REASON FOR VISIT:   Chief Complaint   Patient presents with     Follow Up     Patient was doing better but has had a bad headache again this week, wondering if he needs to increase dose      SUBJECTIVE:                                                      HISTORY OF PRESENT ILLNESS: Patient is here to follow up regarding primary cough headache.  The last visit was on 10/3/2022.  At that time we decided to try indomethacin. Please refer to my initial/other prior notes for further information.    Since the last visit, the patient reports notable headache improvement after trial of indomethacin.  He gradually increased the dose to 50 mg twice daily, which controled his symptoms pretty well until last Wednesday when with bout of cough he experienced 9/10 headache that lasted on and off until Sunday.  Today, he feels better, but wonders if dose needs to be adjusted or other medications considered.  He denies interval development of new focal neurological symptoms.  EXAM:                                                    Physical Exam:   Vitals: /88 (BP Location: Right arm, Patient Position: Sitting, Cuff Size: Adult Regular)   Pulse 77   SpO2 96%     General: pt is in NAD, cooperative.  Skin: normal turgor, moist mucous membranes, no lesions/rashes noticed.  HEENT: ATNC, white sclera, normal conjunctiva.  Respiratory: Symmetric lung excursion, no accessory respiratory muscle use.  Abdomen: Non distended.  Neurological: awake, cooperative, follows commands, no exam  changes compared to the previous visits.  ASSESSMENT AND PLAN:                                                    Assessment: 57 year old male patient presents for follow-up of suspected primary cough headache after trial of indomethacin.  He reports headache improvement on 50 mg of indomethacin twice daily, though experienced flare of headache last week due to frequent cough.  We decided to increase his dose further to 50 mg 3 times per day discussing potential side effects and the need to take it with food.  Additional treatment options include propranolol, naproxen, and gabapentin.  They will be considered if his pain is not fully controlled with increased dose of indomethacin.    Diagnoses:    ICD-10-CM    1. Primary cough headache  G44.83         Plan: At today's visit we thoroughly discussed current symptoms, available treatment options, and the plan.    We decided to increase the dose of indomethacin to 50 mg 3 times per day with food to see if it provides better control of his symptoms.  We also discussed that we might consider different treatment options if it does not help.    Next follow-up appointment is in the next 4 months or earlier if needed.    Total Time: 21 minutes spent on the date of the encounter doing chart review, history and exam, documentation and further activities per the note.    Gigi Agosto MD  [facility] Neurology  (Chart documentation was completed in part with Dragon voice-recognition software. Even though reviewed, some grammatical, spelling, and word errors may remain.)      Harshad Ng is a 57 year old male who presents for:  Chief Complaint   Patient presents with     Follow Up     Patient was doing better but has had a bad headache again this week, wondering if he needs to increase dose         Initial Vitals:  /88 (BP Location: Right arm, Patient Position: Sitting, Cuff Size: Adult Regular)   Pulse 77   SpO2 96%  Estimated body mass index is  "25.31 kg/m  as calculated from the following:    Height as of 9/15/22: 1.816 m (5' 11.5\").    Weight as of 9/15/22: 83.5 kg (184 lb).. There is no height or weight on file to calculate BSA. BP completed using cuff size: harsh Segura      Again, thank you for allowing me to participate in the care of your patient.        Sincerely,        Gigi Agosto MD    "

## 2023-01-09 NOTE — PROGRESS NOTES
ESTABLISHED PATIENT NEUROLOGY NOTE    DATE OF VISIT: 1/9/2023  CLINIC LOCATION: [facility]  MRN: [id number]  PATIENT NAME: Harshad Ng  YOB: 1965    REASON FOR VISIT:   Chief Complaint   Patient presents with     Follow Up     Patient was doing better but has had a bad headache again this week, wondering if he needs to increase dose      SUBJECTIVE:                                                      HISTORY OF PRESENT ILLNESS: Patient is here to follow up regarding primary cough headache.  The last visit was on 10/3/2022.  At that time we decided to try indomethacin. Please refer to my initial/other prior notes for further information.    Since the last visit, the patient reports notable headache improvement after trial of indomethacin.  He gradually increased the dose to 50 mg twice daily, which controled his symptoms pretty well until last Wednesday when with bout of cough he experienced 9/10 headache that lasted on and off until Sunday.  Today, he feels better, but wonders if dose needs to be adjusted or other medications considered.  He denies interval development of new focal neurological symptoms.  EXAM:                                                    Physical Exam:   Vitals: /88 (BP Location: Right arm, Patient Position: Sitting, Cuff Size: Adult Regular)   Pulse 77   SpO2 96%     General: pt is in NAD, cooperative.  Skin: normal turgor, moist mucous membranes, no lesions/rashes noticed.  HEENT: ATNC, white sclera, normal conjunctiva.  Respiratory: Symmetric lung excursion, no accessory respiratory muscle use.  Abdomen: Non distended.  Neurological: awake, cooperative, follows commands, no exam changes compared to the previous visits.  ASSESSMENT AND PLAN:                                                    Assessment: 57 year old male patient presents for follow-up of suspected primary cough headache after trial of indomethacin.  He reports headache  improvement on 50 mg of indomethacin twice daily, though experienced flare of headache last week due to frequent cough.  We decided to increase his dose further to 50 mg 3 times per day discussing potential side effects and the need to take it with food.  Additional treatment options include propranolol, naproxen, and gabapentin.  They will be considered if his pain is not fully controlled with increased dose of indomethacin.    Diagnoses:    ICD-10-CM    1. Primary cough headache  G44.83         Plan: At today's visit we thoroughly discussed current symptoms, available treatment options, and the plan.    We decided to increase the dose of indomethacin to 50 mg 3 times per day with food to see if it provides better control of his symptoms.  We also discussed that we might consider different treatment options if it does not help.    Next follow-up appointment is in the next 4 months or earlier if needed.    Total Time: 21 minutes spent on the date of the encounter doing chart review, history and exam, documentation and further activities per the note.    Gigi Agosto MD  [facility] Neurology  (Chart documentation was completed in part with Dragon voice-recognition software. Even though reviewed, some grammatical, spelling, and word errors may remain.)

## 2023-01-09 NOTE — PATIENT INSTRUCTIONS
AFTER VISIT SUMMARY (AVS):    At today's visit we thoroughly discussed current symptoms, available treatment options, and the plan.    We decided to increase the dose of indomethacin to 50 mg 3 times per day with food to see if it provides better control of your symptoms.  We also discussed that we might consider different treatment options if it does not help.    Next follow-up appointment is in the next 4 months or earlier if needed.    Please do not hesitate to call me with any questions or concerns.    Thanks.

## 2023-01-09 NOTE — PROGRESS NOTES
"Harshad Ng is a 57 year old male who presents for:  Chief Complaint   Patient presents with     Follow Up     Patient was doing better but has had a bad headache again this week, wondering if he needs to increase dose         Initial Vitals:  /88 (BP Location: Right arm, Patient Position: Sitting, Cuff Size: Adult Regular)   Pulse 77   SpO2 96%  Estimated body mass index is 25.31 kg/m  as calculated from the following:    Height as of 9/15/22: 1.816 m (5' 11.5\").    Weight as of 9/15/22: 83.5 kg (184 lb).. There is no height or weight on file to calculate BSA. BP completed using cuff size: harsh Segura  "

## 2023-01-27 ENCOUNTER — TELEPHONE (OUTPATIENT)
Dept: INTERNAL MEDICINE | Facility: CLINIC | Age: 58
End: 2023-01-27

## 2023-01-27 DIAGNOSIS — Z00.00 ROUTINE GENERAL MEDICAL EXAMINATION AT A HEALTH CARE FACILITY: Primary | ICD-10-CM

## 2023-01-27 NOTE — TELEPHONE ENCOUNTER
Order/Referral Request    Who is requesting: Patient per a suggest from a [facility] provider    Orders being requested: Heart Calcium Scan, patient had a complete physical at [facility] and that provider told patient to reach out to his primary provider to get this test done    Reason service is needed/diagnosis:     When are orders needed by:     Has this been discussed with Provider: No    Does patient have a preference on a Group/Provider/Facility? [facility]    Does patient have an appointment scheduled?: No    Where to send orders: n/a    Could we send this information to you in [place] or would you prefer to receive a phone call?:   Patient would prefer a phone call   Okay to leave a detailed message?: Yes at Cell number on file:    Telephone Information:   Mobile [phone]     Yuki Gray   [facility]  Central Scheduler

## 2023-02-08 ENCOUNTER — TELEPHONE (OUTPATIENT)
Dept: INTERNAL MEDICINE | Facility: CLINIC | Age: 58
End: 2023-02-08

## 2023-02-08 DIAGNOSIS — G44.83 PRIMARY COUGH HEADACHE: Primary | ICD-10-CM

## 2023-02-08 NOTE — TELEPHONE ENCOUNTER
Medication Question or Refill    Contacts       Type Contact Phone/Fax    02/08/2023 01:24 PM CST Phone (Incoming) Lamont Ng (Self) [phone] (H)          What medication are you calling about (include dose and sig)?: Indomethacin 50 mg capsule    Preferred Pharmacy:     [id number] - [place], [place] - [address] AT SEC OF [facility] [address]  [address] [facility] [address]  Phone: [phone] Fax: [phone]      Controlled Substance Agreement on file:   CSA -- Patient Level:    CSA: None found at the patient level.       Who prescribed the medication?: Harshad Romero    Do you need a refill? Yes    When did you use the medication last? Today    Patient offered an appointment? No    Do you have any questions or concerns?  Yes: Patient would like to drop down from the 50 mg that he is currently on to 25 mg instead.  Stating he is trying to get off the medication entirely, but cannot just stop it cold turkey.  Would like to drop to 25 mg instead.      Could we send this information to you in [place] or would you prefer to receive a phone call?:   Patient would prefer a phone call   Okay to leave a detailed message?: Yes at Home number on file [phone] (home)

## 2023-02-09 NOTE — TELEPHONE ENCOUNTER
Regarding indomethacin    Patient would like to drop down from the 50 mg that he is currently on to 25 mg instead.  Stating he is trying to get off the medication entirely, but cannot just stop it cold turkey.  Would like to drop to 25 mg instead.    Please advise.

## 2023-02-09 NOTE — TELEPHONE ENCOUNTER
Harshad Romero MD  You 44 minutes ago (3:44 PM)     MB  Okay to decrease to indomethacin 25 mg capsules or tablets dispense #100 take 1 or 2 daily with food.  With 3 refills.  Please call patient and pharmacy and bowen up the medication and I will gladly cosign.  Many thanks.  KELLEN GALO

## 2023-02-09 NOTE — TELEPHONE ENCOUNTER
Medication Conor'd up please sign.    Left detailed message on patient's phone relaying that PCP says it is okay to decrease medication and we will send the new prescription to the pharmacy.

## 2023-02-10 RX ORDER — INDOMETHACIN 25 MG/1
CAPSULE ORAL
Qty: 100 CAPSULE | Refills: 3 | Status: SHIPPED | OUTPATIENT
Start: 2023-02-10 | End: 2023-05-11

## 2023-02-22 ENCOUNTER — HOSPITAL ENCOUNTER (OUTPATIENT)
Dept: CT IMAGING | Facility: CLINIC | Age: 58
Discharge: HOME | End: 2023-02-22
Attending: INTERNAL MEDICINE | Admitting: INTERNAL MEDICINE

## 2023-02-22 DIAGNOSIS — Z00.00 ROUTINE GENERAL MEDICAL EXAMINATION AT A HEALTH CARE FACILITY: ICD-10-CM

## 2023-02-22 LAB
CV CALCIUM SCORE AGATSTON LM: 0
CV CALCIUM SCORING AGATSON LAD: 0
CV CALCIUM SCORING AGATSTON CX: 0
CV CALCIUM SCORING AGATSTON RCA: 0
CV CALCIUM SCORING AGATSTON TOTAL: 0

## 2023-02-22 PROCEDURE — 75571 CT HRT W/O DYE W/CA TEST: CPT | Mod: 26 | Performed by: INTERNAL MEDICINE

## 2023-02-22 PROCEDURE — 75571 CT HRT W/O DYE W/CA TEST: CPT

## 2023-03-02 ENCOUNTER — VIRTUAL VISIT (OUTPATIENT)
Dept: INTERNAL MEDICINE | Facility: CLINIC | Age: 58
End: 2023-03-02
Payer: COMMERCIAL

## 2023-03-02 DIAGNOSIS — I10 ESSENTIAL HYPERTENSION: Primary | ICD-10-CM

## 2023-03-02 PROCEDURE — 99213 OFFICE O/P EST LOW 20 MIN: CPT | Mod: 93 | Performed by: INTERNAL MEDICINE

## 2023-03-02 RX ORDER — PROPRANOLOL HCL 60 MG
60 CAPSULE, EXTENDED RELEASE 24HR ORAL DAILY
Qty: 90 CAPSULE | Refills: 11 | Status: SHIPPED | OUTPATIENT
Start: 2023-03-02 | End: 2023-09-07

## 2023-03-02 NOTE — PROGRESS NOTES
Harshad Ng is a 57 year oldwho is being evaluated via a billable telephone visit.       What phone number would you like to be contacted at? [phone]      Assessment & Plan  Hyperlipidemia with 0 coronary calcium score once off statin okay by me.    Headaches and prevention better with indomethacin although returned with intermittent sudden taper 25 mg twice daily previously on indomethacin 50 mg 3 times a day.  Neurologist suggested propranolol extended release 60 mg daily #91 daily with 11 refills written by this examiner today.  We had a good discussion.  Headaches have slightly returned.  Migraines.  [facility]     11 minutes spent on the date of the encounter doing chart review, patient visit and documentation  and I spoke with the patient directly on the phone 5 minutes.       Subjective   As above     Review of Systems   No blood in stool or urine denies chest pain shortness of breath medication reviewed and reconciled.  Exercises 4 times a week on the elliptical as well as stretching Monday Tuesday Thursday Friday no trouble no chest pain shortness of breath or exertional syncope noted       Harshad Romero MD  Answers for HPI/ROS submitted by the patient on 2/27/2023  Are you regularly taking any medication or supplement to lower your cholesterol?: Yes  Are you having muscle aches or other side effects that you think could be caused by your cholesterol lowering medication?: No  How many servings of fruits and vegetables do you eat daily?: 2-3  On average, how many sweetened beverages do you drink each day (Examples: soda, juice, sweet tea, etc.  Do NOT count diet or artificially sweetened beverages)?: 0  How many minutes a day do you exercise enough to make your heart beat faster?: 20 to 29  How many days a week do you exercise enough to make your heart beat faster?: 4  How many days per week do you miss taking your medication?: 0

## 2023-04-17 ENCOUNTER — TRANSFERRED RECORDS (OUTPATIENT)
Dept: HEALTH INFORMATION MANAGEMENT | Facility: CLINIC | Age: 58
End: 2023-04-17

## 2023-05-10 NOTE — PROGRESS NOTES
ESTABLISHED PATIENT NEUROLOGY NOTE    DATE OF VISIT: 5/11/2023  CLINIC LOCATION: [facility]  MRN: [id number]  PATIENT NAME: Harshad Ng  YOB: 1965    REASON FOR VISIT: No chief complaint on file.    SUBJECTIVE:                                                      HISTORY OF PRESENT ILLNESS: Patient is here to follow up regarding primary cough headache.  During the last visit on 1/9/2023 the dose of indomethacin was increased. Please refer to my initial/other prior notes for further information.    Since the last visit, the patient reports *** after increasing the dose of indomethacin to 50 mg 3 times per day, which was changed later to propranolol by [place] neurologist.  No significant side effects or interval development of new neurological symptoms.    Was evaluated at neurologic department of [facility] in February 2023.  Presentation felt consistent with probable migraines with potential contribution from cough induced headaches.  Repeat brain MRI with and without contrast and neck MRA were normal.  He was advised to switch from indomethacin to propranolol.  EXAM:                                                    Physical Exam:   Vitals: There were no vitals taken for this visit.    General: pt is in NAD, cooperative.  Skin: normal turgor, moist mucous membranes, no lesions/rashes noticed.  HEENT: ATNC, white sclera, normal conjunctiva.  Respiratory: Symmetric lung excursion, no accessory respiratory muscle use.  Abdomen: Non distended.  Neurological: awake, cooperative, follows commands, no aphasia or dysarthria noted, cranial nerves II-XII: no ptosis, face is symmetric, equally moves all extremities, no dysmetria bilaterally, casual gait is normal.  ASSESSMENT AND PLAN:                                                    Assessment: 58 year old male patient presents for follow-up of primary cough headaches.  He reports *** after increasing the dose of indomethacin to 50  mg 3 times per day.  Previously discussed additional treatment options include propranolol, naproxen, and gabapentin.    Diagnoses:    ICD-10-CM    1. Primary cough headache  G44.83         Plan:  There are no Patient Instructions on file for this visit.    Total Time: *** minutes spent on the date of the encounter doing chart review, history and exam, documentation and further activities per the note.    Gigi Agosto MD  [facility] Neurology  (Chart documentation was completed in part with Dragon voice-recognition software. Even though reviewed, some grammatical, spelling, and word errors may remain.)

## 2023-05-11 ENCOUNTER — OFFICE VISIT (OUTPATIENT)
Dept: NEUROLOGY | Facility: CLINIC | Age: 58
End: 2023-05-11
Payer: COMMERCIAL

## 2023-05-11 VITALS — HEART RATE: 64 BPM | SYSTOLIC BLOOD PRESSURE: 122 MMHG | DIASTOLIC BLOOD PRESSURE: 88 MMHG | OXYGEN SATURATION: 99 %

## 2023-05-11 DIAGNOSIS — G44.83 PRIMARY COUGH HEADACHE: Primary | ICD-10-CM

## 2023-05-11 DIAGNOSIS — I10 ESSENTIAL HYPERTENSION: ICD-10-CM

## 2023-05-11 PROCEDURE — 99214 OFFICE O/P EST MOD 30 MIN: CPT | Performed by: PSYCHIATRY & NEUROLOGY

## 2023-05-11 NOTE — PROGRESS NOTES
"Harshad Ng is a 58 year old male who presents for:  Chief Complaint   Patient presents with     Follow Up     Cough Induced headache- patient was put on propranolol at his annual physical and it has helped a lot. He wants to discuss what you think about it and if it would be something he would continue lifelong         Initial Vitals:  /88 (BP Location: Right arm, Patient Position: Sitting, Cuff Size: Adult Regular)   Pulse 64   SpO2 99%  Estimated body mass index is 25.31 kg/m  as calculated from the following:    Height as of 9/15/22: 1.816 m (5' 11.5\").    Weight as of 9/15/22: 83.5 kg (184 lb).. There is no height or weight on file to calculate BSA. BP completed using cuff size: harsh Segura  "

## 2023-05-11 NOTE — PATIENT INSTRUCTIONS
AFTER VISIT SUMMARY (AVS):    At today's visit we thoroughly discussed current symptoms, available treatment options, and the plan.    We decided not to make any medication changes, but discussed future taper of propranolol and additional treatment options if it causes side effects.    Next follow-up appointment is in the next 4 months or earlier if needed.    Please do not hesitate to call me with any questions or concerns.    Thanks.

## 2023-05-11 NOTE — LETTER
5/11/2023         RE: Harshad Ng  [address] [facility] [address]        Dear Colleague,    Thank you for referring your patient, Harshad Ng, to the [facility]. Please see a copy of my visit note below.    ESTABLISHED PATIENT NEUROLOGY NOTE    DATE OF VISIT: 5/11/2023  CLINIC LOCATION: [facility]  MRN: [id number]  PATIENT NAME: Harshad Ng  YOB: 1965    REASON FOR VISIT:   Chief Complaint   Patient presents with     Follow Up     Cough Induced headache- patient was put on propranolol at his annual physical and it has helped a lot. He wants to discuss what you think about it and if it would be something he would continue lifelong      SUBJECTIVE:                                                      HISTORY OF PRESENT ILLNESS: Patient is here to follow up regarding primary cough headache.  During the last visit on 1/9/2023 the dose of indomethacin was increased. Please refer to my initial/other prior notes for further information.    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  No significant side effects or interval development of new neurological symptoms.    Was evaluated at neurologic department of [facility] in February 2023.  Presentation felt consistent with probable migraines with potential contribution from cough induced headaches.  Repeat brain MRI with and without contrast and neck MRA were normal.  He was advised to switch from indomethacin to propranolol.  EXAM:                                                    Physical Exam:   Vitals: /88 (BP Location: Right arm, Patient Position: Sitting, Cuff Size: Adult Regular)   Pulse 64   SpO2 99%     General: pt is in NAD, cooperative.  Skin: normal turgor, moist mucous  membranes, no lesions/rashes noticed.  HEENT: ATNC, white sclera, normal conjunctiva.  Respiratory: Symmetric lung excursion, no accessory respiratory muscle use.  Abdomen: Non distended.  Neurological: awake, cooperative, follows commands, no aphasia or dysarthria noted, cranial nerves II-XII: no ptosis, face is symmetric, equally moves all extremities, no dysmetria bilaterally, casual gait is normal.  ASSESSMENT AND PLAN:                                                    Assessment: 58 year old male patient presents for follow-up of primary cough headaches.  He reports that indomethacin was switched to propranolol due to concern of long-term side effects in March 2023 (after tapering off indomethacin that led to return of headaches).  He tolerates propranolol well, and his headaches are well controlled.  He wants to discuss duration of treatment and additional treatment options.  In my opinion, he should continue propranolol at least for 6 months prior to attempt at tapering it, which should be very gradual.  Previously discussed additional treatment options include naproxen and gabapentin.  We decided to continue propranolol without changes and consider tapering if symptoms remain well controlled at the next follow-up visit in 4 months.    Diagnoses:    ICD-10-CM    1. Primary cough headache  G44.83         Plan: At today's visit we thoroughly discussed current symptoms, available treatment options, and the plan.    We decided not to make any medication changes, but discussed future taper of propranolol and additional treatment options if it causes side effects.    Next follow-up appointment is in the next 4 months or earlier if needed.    Total Time: 31 minutes spent on the date of the encounter doing chart review, history and exam, documentation and further activities per the note.    Gigi Agosto MD  [facility] Neurology  (Chart documentation was completed in part with Soumya  "voice-recognition software. Even though reviewed, some grammatical, spelling, and word errors may remain.)      Harshad Ng is a 58 year old male who presents for:  Chief Complaint   Patient presents with     Follow Up     Cough Induced headache- patient was put on propranolol at his annual physical and it has helped a lot. He wants to discuss what you think about it and if it would be something he would continue lifelong         Initial Vitals:  /88 (BP Location: Right arm, Patient Position: Sitting, Cuff Size: Adult Regular)   Pulse 64   SpO2 99%  Estimated body mass index is 25.31 kg/m  as calculated from the following:    Height as of 9/15/22: 1.816 m (5' 11.5\").    Weight as of 9/15/22: 83.5 kg (184 lb).. There is no height or weight on file to calculate BSA. BP completed using cuff size: regular    Sowmya Segura      Again, thank you for allowing me to participate in the care of your patient.        Sincerely,        Gigi Agosto MD    "

## 2023-05-11 NOTE — PROGRESS NOTES
ESTABLISHED PATIENT NEUROLOGY NOTE    DATE OF VISIT: 5/11/2023  CLINIC LOCATION: [facility]  MRN: [id number]  PATIENT NAME: Harshad Ng  YOB: 1965    REASON FOR VISIT:   Chief Complaint   Patient presents with     Follow Up     Cough Induced headache- patient was put on propranolol at his annual physical and it has helped a lot. He wants to discuss what you think about it and if it would be something he would continue lifelong      SUBJECTIVE:                                                      HISTORY OF PRESENT ILLNESS: Patient is here to follow up regarding primary cough headache.  During the last visit on 1/9/2023 the dose of indomethacin was increased. Please refer to my initial/other prior notes for further information.    Since the last visit, the patient reports improvement in headaches after increasing the dose of indomethacin, but eventually it was switched to propranolol by [place] neurologist when he did his executive physical (see below).  No significant side effects or interval development of new neurological symptoms.    Was evaluated at neurologic department of [facility] in February 2023.  Presentation felt consistent with probable migraines with potential contribution from cough induced headaches.  Repeat brain MRI with and without contrast and neck MRA were normal.  He was advised to switch from indomethacin to propranolol.  EXAM:                                                    Physical Exam:   Vitals: /88 (BP Location: Right arm, Patient Position: Sitting, Cuff Size: Adult Regular)   Pulse 64   SpO2 99%     General: pt is in NAD, cooperative.  Skin: normal turgor, moist mucous membranes, no lesions/rashes noticed.  HEENT: ATNC, white sclera, normal conjunctiva.  Respiratory: Symmetric lung excursion, no accessory respiratory muscle use.  Abdomen: Non distended.  Neurological: awake, cooperative, follows commands, no aphasia or dysarthria noted,  cranial nerves II-XII: no ptosis, face is symmetric, equally moves all extremities, no dysmetria bilaterally, casual gait is normal.  ASSESSMENT AND PLAN:                                                    Assessment: 58 year old male patient presents for follow-up of primary cough headaches.  He reports that indomethacin was switched to propranolol due to concern of long-term side effects in March 2023 (after tapering off indomethacin that led to return of headaches).  He tolerates propranolol well, and his headaches are well controlled.  He wants to discuss duration of treatment and additional treatment options.  In my opinion, he should continue propranolol at least for 6 months prior to attempt at tapering it, which should be very gradual.  Previously discussed additional treatment options include naproxen and gabapentin.  We decided to continue propranolol without changes and consider tapering if symptoms remain well controlled at the next follow-up visit in 4 months.    Diagnoses:    ICD-10-CM    1. Primary cough headache  G44.83         Plan: At today's visit we thoroughly discussed current symptoms, available treatment options, and the plan.    We decided not to make any medication changes, but discussed future taper of propranolol and additional treatment options if it causes side effects.    Next follow-up appointment is in the next 4 months or earlier if needed.    Total Time: 31 minutes spent on the date of the encounter doing chart review, history and exam, documentation and further activities per the note.    Gigi Agosto MD  [facility] Neurology  (Chart documentation was completed in part with Dragon voice-recognition software. Even though reviewed, some grammatical, spelling, and word errors may remain.)

## 2023-06-11 ENCOUNTER — HEALTH MAINTENANCE LETTER (OUTPATIENT)
Age: 58
End: 2023-06-11

## 2023-08-01 NOTE — PROGRESS NOTES
Harshad Ng is a 56 y.o. male who is being evaluated via a billable telephone visit.      What phone number would you like to be contacted at? [phone]  How would you like to obtain your AVS? AVS Preference: MyChart.    Assessment & Plan     Cough better with famotidine worse with famotidine and omeprazole.  Famotidine safest and best taken with food for enhance absorption.    Family concerns Father with dementia and paranoia.  Needs office visit virtual visit by phone with this examiner soon.    Review of external notes as documented in note  21 minutes spent on the date of the encounter doing chart review, patient visit and documentation        No follow-ups on file.    Harshad Romero MD  [facility]   Harshad Ng is 56 y.o. and presents today for the following health issues   HPI       Much of the conversation centered on his father with increased anxiety paranoia and signs of dementia.  May need neurologic consultation plus Seroquel or Depakote.    Review of Systems  No blood in stool or urine medication [place] list reviewed reconciled.  Denies chest pain shortness of breath.  Non-smoker no excess alcohol.      Objective       Vitals:  No vitals were obtained today due to virtual visit.    Physical Exam  No physical examination was done as this was a virtual visit by telephone.            Phone call duration: 21 minutes we had a lengthy discussion Thomas about the patient but his father and interrelationship between he and his father and his father's dementia with paranoia   Intermediate Repair And Flap Additional Text (Will Appearing After The Standard Complex Repair Text): The intermediate repair was not sufficient to completely close the primary defect. The remaining additional defect was repaired with the flap mentioned below.

## 2023-09-06 ENCOUNTER — TELEPHONE (OUTPATIENT)
Dept: NEUROLOGY | Facility: CLINIC | Age: 58
End: 2023-09-06
Payer: COMMERCIAL

## 2023-09-06 NOTE — PROGRESS NOTES
ESTABLISHED PATIENT NEUROLOGY NOTE    DATE OF VISIT: 9/7/2023  CLINIC LOCATION: [facility]  MRN: [id number]  PATIENT NAME: Harshad Ng  YOB: 1965    REASON FOR VISIT: No chief complaint on file.    SUBJECTIVE:                                                      HISTORY OF PRESENT ILLNESS: Patient is here to follow up regarding primary cough headache.  Last visit was on 5/11/2023. Please refer to my initial/other prior notes for further information.    Since the last visit, the patient reports ***.  He is on 60 mg of propranolol daily without noticeable side effects.  He denies interval development of new neurological symptoms.  EXAM:                                                    Physical Exam:   Vitals: There were no vitals taken for this visit.    General: pt is in NAD, cooperative.  Skin: normal turgor, moist mucous membranes, no lesions/rashes noticed.  HEENT: ATNC, white sclera, normal conjunctiva.  Respiratory: Symmetric lung excursion, no accessory respiratory muscle use.  Abdomen: Non distended.  Neurological: awake, cooperative, follows commands, no exam changes compared to the previous visits.  ASSESSMENT AND PLAN:                                                    Assessment: 58 year old male patient presents for follow-up of primary cough headaches.  He reports that his symptoms are *** on current therapy with propranolol.  The plan was to consider tapering off propranolol if headaches remain well controlled.  Additional treatment options include gabapentin and naproxen.    Diagnoses:    ICD-10-CM    1. Primary cough headache  G44.83         Plan:  There are no Patient Instructions on file for this visit.    Total Time: *** minutes spent on the date of the encounter doing chart review, history and exam, documentation and further activities per the note.    Gigi Agosto MD  [facility] Neurology  (Chart documentation was completed in part with  Dragon voice-recognition software. Even though reviewed, some grammatical, spelling, and word errors may remain.)

## 2023-09-07 ENCOUNTER — OFFICE VISIT (OUTPATIENT)
Dept: NEUROLOGY | Facility: CLINIC | Age: 58
End: 2023-09-07
Payer: COMMERCIAL

## 2023-09-07 VITALS
HEIGHT: 72 IN | HEART RATE: 58 BPM | DIASTOLIC BLOOD PRESSURE: 87 MMHG | OXYGEN SATURATION: 98 % | WEIGHT: 186.6 LBS | SYSTOLIC BLOOD PRESSURE: 133 MMHG | BODY MASS INDEX: 25.27 KG/M2

## 2023-09-07 DIAGNOSIS — G44.83 PRIMARY COUGH HEADACHE: Primary | ICD-10-CM

## 2023-09-07 DIAGNOSIS — I10 ESSENTIAL HYPERTENSION: ICD-10-CM

## 2023-09-07 PROCEDURE — 99214 OFFICE O/P EST MOD 30 MIN: CPT | Performed by: PSYCHIATRY & NEUROLOGY

## 2023-09-07 RX ORDER — PROPRANOLOL HYDROCHLORIDE 20 MG/1
20 TABLET ORAL 2 TIMES DAILY
Qty: 60 TABLET | Refills: 3 | Status: SHIPPED | OUTPATIENT
Start: 2023-09-07 | End: 2024-03-05

## 2023-09-07 ASSESSMENT — PAIN SCALES - GENERAL: PAINLEVEL: NO PAIN (0)

## 2023-09-07 NOTE — LETTER
"    9/7/2023         RE: Harshad Ng  [address] [facility] [address]        Dear Colleague,    Thank you for referring your patient, Harshad Ng, to the [facility] NEUROLOGY CLINICS [facility]. Please see a copy of my visit note below.    ESTABLISHED PATIENT NEUROLOGY NOTE    DATE OF VISIT: 9/7/2023  CLINIC LOCATION: [facility]  MRN: [id number]  PATIENT NAME: Harshad Ng  YOB: 1965    REASON FOR VISIT:   Chief Complaint   Patient presents with     Neurologic Problem     Cough induced headaches      SUBJECTIVE:                                                      HISTORY OF PRESENT ILLNESS: Patient is here to follow up regarding primary cough headache.  Last visit was on 5/11/2023. Please refer to my initial/other prior notes for further information.    Since the last visit, the patient reports that his headaches are completely controlled.  He is on 60 mg of propranolol daily without noticeable side effects.  He denies interval development of new neurological symptoms.  EXAM:                                                    Physical Exam:   Vitals: /87   Pulse 58   Ht 1.835 m (6' 0.25\")   Wt 84.6 kg (186 lb 9.6 oz)   SpO2 98%   BMI 25.13 kg/m      General: pt is in NAD, cooperative.  Skin: normal turgor, moist mucous membranes, no lesions/rashes noticed.  HEENT: ATNC, white sclera, normal conjunctiva.  Respiratory: Symmetric lung excursion, no accessory respiratory muscle use.  Abdomen: Non distended.  Neurological: awake, cooperative, follows commands, no exam changes compared to the previous visits.  ASSESSMENT AND PLAN:                                                    Assessment: 58 year old male patient presents for follow-up of primary cough headaches.  He reports that his symptoms are completely controlled on current therapy with propranolol.  The plan was to consider tapering off propranolol if headaches remain well controlled. "  We discussed that today, and decided to proceed.  We will reduce the dose to 40 mg daily with plans to go down to 20 mg daily in a few months and potentially completely discontinue it if headaches do not return.  Additional treatment options for the future use include gabapentin and naproxen.    Diagnoses:    ICD-10-CM    1. Primary cough headache  G44.83         Plan: At today's visit we thoroughly discussed current symptoms, available treatment options, and the plan.    We decided to reduce the dose of propranolol.  I advised the patient to take 20 mg twice daily for the next several months.  I asked him to reach out to me if he experiences recurrence of headaches or other issues.  We also discussed importance of monitoring his blood pressure and heart rate.    Next follow-up appointment is in the next 4 months or earlier if needed.    Total Time: 23 minutes spent on the date of the encounter doing chart review, history and exam, documentation and further activities per the note.    Gigi Agosto MD  [facility] Neurology  (Chart documentation was completed in part with Dragon voice-recognition software. Even though reviewed, some grammatical, spelling, and word errors may remain.)      Again, thank you for allowing me to participate in the care of your patient.        Sincerely,        Gigi Agosto MD

## 2023-09-07 NOTE — PROGRESS NOTES
"ESTABLISHED PATIENT NEUROLOGY NOTE    DATE OF VISIT: 9/7/2023  CLINIC LOCATION: [facility]  MRN: [id number]  PATIENT NAME: Harshad Ng  YOB: 1965    REASON FOR VISIT:   Chief Complaint   Patient presents with    Neurologic Problem     Cough induced headaches      SUBJECTIVE:                                                      HISTORY OF PRESENT ILLNESS: Patient is here to follow up regarding primary cough headache.  Last visit was on 5/11/2023. Please refer to my initial/other prior notes for further information.    Since the last visit, the patient reports that his headaches are completely controlled.  He is on 60 mg of propranolol daily without noticeable side effects.  He denies interval development of new neurological symptoms.  EXAM:                                                    Physical Exam:   Vitals: /87   Pulse 58   Ht 1.835 m (6' 0.25\")   Wt 84.6 kg (186 lb 9.6 oz)   SpO2 98%   BMI 25.13 kg/m      General: pt is in NAD, cooperative.  Skin: normal turgor, moist mucous membranes, no lesions/rashes noticed.  HEENT: ATNC, white sclera, normal conjunctiva.  Respiratory: Symmetric lung excursion, no accessory respiratory muscle use.  Abdomen: Non distended.  Neurological: awake, cooperative, follows commands, no exam changes compared to the previous visits.  ASSESSMENT AND PLAN:                                                    Assessment: 58 year old male patient presents for follow-up of primary cough headaches.  He reports that his symptoms are completely controlled on current therapy with propranolol.  The plan was to consider tapering off propranolol if headaches remain well controlled.  We discussed that today, and decided to proceed.  We will reduce the dose to 40 mg daily with plans to go down to 20 mg daily in a few months and potentially completely discontinue it if headaches do not return.  Additional treatment options for the future use " include gabapentin and naproxen.    Diagnoses:    ICD-10-CM    1. Primary cough headache  G44.83         Plan: At today's visit we thoroughly discussed current symptoms, available treatment options, and the plan.    We decided to reduce the dose of propranolol.  I advised the patient to take 20 mg twice daily for the next several months.  I asked him to reach out to me if he experiences recurrence of headaches or other issues.  We also discussed importance of monitoring his blood pressure and heart rate.    Next follow-up appointment is in the next 4 months or earlier if needed.    Total Time: 23 minutes spent on the date of the encounter doing chart review, history and exam, documentation and further activities per the note.    Gigi Agosto MD  [facility] Neurology  (Chart documentation was completed in part with Dragon voice-recognition software. Even though reviewed, some grammatical, spelling, and word errors may remain.)

## 2023-09-07 NOTE — PATIENT INSTRUCTIONS
AFTER VISIT SUMMARY (AVS):    At today's visit we thoroughly discussed current symptoms, available treatment options, and the plan.    We decided to reduce the dose of propranolol.  Please take 20 mg twice daily for the next several months.  Please reach out to me if you experience recurrence of your headaches or other issues.  We also discussed importance of monitoring your blood pressure and heart rate.    Next follow-up appointment is in the next 4 months or earlier if needed.    Please do not hesitate to call me with any questions or concerns.    Thanks.

## 2023-10-12 DIAGNOSIS — G44.83 PRIMARY COUGH HEADACHE: ICD-10-CM

## 2023-10-12 DIAGNOSIS — I10 ESSENTIAL HYPERTENSION: ICD-10-CM

## 2023-10-12 RX ORDER — PROPRANOLOL HYDROCHLORIDE 20 MG/1
20 TABLET ORAL 2 TIMES DAILY
Qty: 60 TABLET | Refills: 3 | OUTPATIENT
Start: 2023-10-12

## 2024-01-12 DIAGNOSIS — I10 ESSENTIAL HYPERTENSION: Primary | ICD-10-CM

## 2024-01-12 DIAGNOSIS — G44.83 PRIMARY COUGH HEADACHE: ICD-10-CM

## 2024-01-12 NOTE — TELEPHONE ENCOUNTER
Per last OV note:  Assessment: 58 year old male patient presents for follow-up of primary cough headaches.  He reports that his symptoms are completely controlled on current therapy with propranolol.  The plan was to consider tapering off propranolol if headaches remain well controlled.  We discussed that today, and decided to proceed.  We will reduce the dose to 40 mg daily with plans to go down to 20 mg daily in a few months and potentially completely discontinue it if headaches do not return.  Additional treatment options for the future use include gabapentin and naproxen.     Called and LDVM on descript mailbox, to call back and let us know how his headaches are.     Once he calls back then we can see if provider can send in updated script.     Lorin SAXENA RN, BSN  [facility]

## 2024-01-12 NOTE — TELEPHONE ENCOUNTER
[facility] Call Center    Phone Message    May a detailed message be left on voicemail: yes     Reason for Call: Medication Refill Request    Has the patient contacted the pharmacy for the refill? Yes   Name of medication being requested:   propranolol (INDERAL) 20 MG tablet [[id number]] (Order [id number])   Provider who prescribed the medication: Triny  Pharmacy: [facility]  Date medication is needed: is almost of of Rx, only has 3 days left    Patient is requesting to move down from 20 MG to 10 MG-  Patient currently is taking 20 mg in morning and 20 mg in the evening and Patient is requesting to take 10mg in the morning to 10 mg in the evening.     Please review and call back advice next steps.      Action Taken: Message routed to:  [facility] ([facility] Neurology    Travel Screening: Not Applicable

## 2024-01-15 RX ORDER — PROPRANOLOL HYDROCHLORIDE 10 MG/1
10 TABLET ORAL 2 TIMES DAILY
Qty: 60 TABLET | Refills: 1 | Status: SHIPPED | OUTPATIENT
Start: 2024-01-15 | End: 2024-03-05

## 2024-01-19 NOTE — TELEPHONE ENCOUNTER
Called and LVM for pt letting him know 10 mg tablets of propanolol was sent to [facility] in [place] 1/15/24.     Lorin SAXENA RN, BSN  [facility]rology

## 2024-01-19 NOTE — TELEPHONE ENCOUNTER
[facility] Call Center    Phone Message    May a detailed message be left on voicemail: yes     Reason for Call:  Patient is returning a call to care team regarding his request:    Pt states that he is not experiencing any headaches right now, medication is helping. Patient states that he is hoping to get 10 mg instead of 20 mg and will check with his pharmacy on monday    Action Taken: Other: [facility] neurolog[facility]    Travel Screening: Not Applicable

## 2024-03-05 ENCOUNTER — OFFICE VISIT (OUTPATIENT)
Dept: NEUROLOGY | Facility: CLINIC | Age: 59
End: 2024-03-05
Payer: COMMERCIAL

## 2024-03-05 VITALS — DIASTOLIC BLOOD PRESSURE: 83 MMHG | OXYGEN SATURATION: 98 % | SYSTOLIC BLOOD PRESSURE: 116 MMHG | HEART RATE: 74 BPM

## 2024-03-05 DIAGNOSIS — G44.83 PRIMARY COUGH HEADACHE: Primary | ICD-10-CM

## 2024-03-05 PROCEDURE — 99214 OFFICE O/P EST MOD 30 MIN: CPT | Performed by: PSYCHIATRY & NEUROLOGY

## 2024-03-05 RX ORDER — PROPRANOLOL HYDROCHLORIDE 10 MG/1
10 TABLET ORAL 2 TIMES DAILY
Qty: 180 TABLET | Refills: 0 | Status: SHIPPED | OUTPATIENT
Start: 2024-03-05

## 2024-03-05 NOTE — LETTER
3/5/2024         RE: [facility]arshad Ng  [address] [address]        Dear Colleague,    Thank you for referring your patient, Harshad Ng, to the [facility] NEUROLOGY CLINICS [facility]. Please see a copy of my visit note below.    ESTABLISHED PATIENT NEUROLOGY NOTE    DATE OF VISIT: 3/5/2024  CLINIC LOCATION: [facility]  MRN: [id number]  PATIENT NAME: Harshad Ng  YOB: 1965    REASON FOR VISIT:   Chief Complaint   Patient presents with     Follow Up     Cough Induced Headache- patient said headaches have been really good, patient would like to start getting a 90 day supply       SUBJECTIVE:                                                      HISTORY OF PRESENT ILLNESS: Patient is here to follow up regarding primary cough headache.  During the last visit on 9/7/2023 propranolol dose was reduced.  Please refer to my initial/other prior notes for further information.    Since the last visit, the patient reports complete control of headaches after reducing the propranolol dose.  He was 20 mg of propranolol twice daily and in the middle of January 2024 reduced it down to 10 mg twice daily without headache recurrence.  He denies interval development of new neurological symptoms.  EXAM:                                                    Physical Exam:   Vitals: /83 (BP Location: Left arm, Patient Position: Sitting, Cuff Size: Adult Regular)   Pulse 74   SpO2 98%     General: pt is in NAD, cooperative.  Skin: normal turgor, moist mucous membranes, no lesions/rashes noticed.  HEENT: ATNC, white sclera, normal conjunctiva.  Respiratory: Symmetric lung excursion, no accessory respiratory muscle use.  Abdomen: Non distended.  Neurological: awake, cooperative, follows commands, no aphasia or dysarthria noted, cranial nerves II-XII: no ptosis, face is symmetric, equally moves all extremities, no dysmetria bilaterally, gait is not tested  today.  ASSESSMENT AND PLAN:                                                    Assessment: 58 year old male patient presents for follow-up of primary cough headache.  He reports that his headaches are completely controlled after reducing the dose of propranolol.  Previously we discussed the plan to wean him off completely, which he is in agreement for.  We discussed that if his headaches recur, propranolol could be restarted in the future.    Diagnoses:    ICD-10-CM    1. Primary cough headache  G44.83         Plan: At today's visit we thoroughly discussed current symptoms, available treatment options, and the plan.    We decided to continue weaning off propranolol.  I advised the patient to take 10 mg twice daily for additional 1.5 months, then take 10 mg in the morning for 2 to 4 weeks and stop.  We discussed that he could restart the medicine if headaches recur and let me know if he needs additional refills prior to his next visit.    Next follow-up appointment is in the next 5-6 months or earlier if needed.  We also discussed that the follow-up visit could be canceled if headaches do not recur.    Total Time: 22 minutes spent on the date of the encounter doing chart review, history and exam, documentation and further activities per the note.    Gigi Agosto MD  [facility]rology  (Chart documentation was completed in part with Dragon voice-recognition software. Even though reviewed, some grammatical, spelling, and word errors may remain.)    Harshad Ng is a 58 year old male who presents for:  Chief Complaint   Patient presents with     Follow Up     Cough Induced Headache- patient said headaches have been really good, patient would like to start getting a 90 day supply          Initial Vitals:  /83 (BP Location: Left arm, Patient Position: Sitting, Cuff Size: Adult Regular)   Pulse 74   SpO2 98%  Estimated body mass index is 25.13 kg/m  as calculated from the following:     "Height as of 9/7/23: 1.835 m (6' 0.25\").    Weight as of 9/7/23: 84.6 kg (186 lb 9.6 oz).. There is no height or weight on file to calculate BSA. BP completed using cuff size: harsh Segura       Again, thank you for allowing me to participate in the care of your patient.        Sincerely,        Gigi Agosto MD  "

## 2024-03-05 NOTE — PATIENT INSTRUCTIONS
AFTER VISIT SUMMARY (AVS):    At today's visit we thoroughly discussed current symptoms, available treatment options, and the plan.    We decided to continue weaning off propranolol.  Please take 10 mg twice daily for additional 1.5 months and then take 10 mg in the morning for 2 to 4 weeks before completely stopping it.  We discussed that you could restart the medicine if headaches recurs and let me know if you need additional refills prior to the next visit.    Next follow-up appointment is in the next 5-6 months or earlier if needed.  We also discussed that the follow-up visit could be canceled if headaches do not recur.    Please do not hesitate to call me with any questions or concerns.    Thanks.

## 2024-03-05 NOTE — PROGRESS NOTES
ESTABLISHED PATIENT NEUROLOGY NOTE    DATE OF VISIT: 3/5/2024  CLINIC LOCATION: [facility]  MRN: [id number]  PATIENT NAME: Harshad Ng  YOB: 1965    REASON FOR VISIT:   Chief Complaint   Patient presents with    Follow Up     Cough Induced Headache- patient said headaches have been really good, patient would like to start getting a 90 day supply       SUBJECTIVE:                                                      HISTORY OF PRESENT ILLNESS: Patient is here to follow up regarding primary cough headache.  During the last visit on 9/7/2023 propranolol dose was reduced.  Please refer to my initial/other prior notes for further information.    Since the last visit, the patient reports complete control of headaches after reducing the propranolol dose.  He was 20 mg of propranolol twice daily and in the middle of January 2024 reduced it down to 10 mg twice daily without headache recurrence.  He denies interval development of new neurological symptoms.  EXAM:                                                    Physical Exam:   Vitals: /83 (BP Location: Left arm, Patient Position: Sitting, Cuff Size: Adult Regular)   Pulse 74   SpO2 98%     General: pt is in NAD, cooperative.  Skin: normal turgor, moist mucous membranes, no lesions/rashes noticed.  HEENT: ATNC, white sclera, normal conjunctiva.  Respiratory: Symmetric lung excursion, no accessory respiratory muscle use.  Abdomen: Non distended.  Neurological: awake, cooperative, follows commands, no aphasia or dysarthria noted, cranial nerves II-XII: no ptosis, face is symmetric, equally moves all extremities, no dysmetria bilaterally, gait is not tested today.  ASSESSMENT AND PLAN:                                                    Assessment: 58 year old male patient presents for follow-up of primary cough headache.  He reports that his headaches are completely controlled after reducing the dose of propranolol.  Previously  we discussed the plan to wean him off completely, which he is in agreement for.  We discussed that if his headaches recur, propranolol could be restarted in the future.    Diagnoses:    ICD-10-CM    1. Primary cough headache  G44.83         Plan: At today's visit we thoroughly discussed current symptoms, available treatment options, and the plan.    We decided to continue weaning off propranolol.  I advised the patient to take 10 mg twice daily for additional 1.5 months, then take 10 mg in the morning for 2 to 4 weeks and stop.  We discussed that he could restart the medicine if headaches recur and let me know if he needs additional refills prior to his next visit.    Next follow-up appointment is in the next 5-6 months or earlier if needed.  We also discussed that the follow-up visit could be canceled if headaches do not recur.    Total Time: 22 minutes spent on the date of the encounter doing chart review, history and exam, documentation and further activities per the note.    Gigi Agosto MD  [facility] Neurology  (Chart documentation was completed in part with Dragon voice-recognition software. Even though reviewed, some grammatical, spelling, and word errors may remain.)

## 2024-03-05 NOTE — PROGRESS NOTES
"Harshad Ng is a 58 year old male who presents for:  Chief Complaint   Patient presents with    Follow Up     Cough Induced Headache- patient said headaches have been really good, patient would like to start getting a 90 day supply          Initial Vitals:  /83 (BP Location: Left arm, Patient Position: Sitting, Cuff Size: Adult Regular)   Pulse 74   SpO2 98%  Estimated body mass index is 25.13 kg/m  as calculated from the following:    Height as of 9/7/23: 1.835 m (6' 0.25\").    Weight as of 9/7/23: 84.6 kg (186 lb 9.6 oz).. There is no height or weight on file to calculate BSA. BP completed using cuff size: harsh Segura   "

## 2024-04-11 NOTE — PROGRESS NOTES
INITIAL NEUROLOGY CONSULTATION    DATE OF VISIT: 9/15/2022  CLINIC LOCATION: [facility]  MRN: [id number]  PATIENT NAME: Harshad Ng  YOB: 1965    REASON FOR VISIT:   Chief Complaint   Patient presents with     Headache     Started in Feb. 2022     HISTORY OF PRESENT ILLNESS:                                                    Mr. Harshad Ng is 57 year old right handed male patient with past medical history of skin cancer and sinusitis, who was seen today for headache.    Per patient's report, he developed persistent headaches since February 2022.  It felt similar to his usual sinus related headaches.  He treated symptomatically with Aleve, ibuprofen, and Tylenol.  Eventually he was seen by ENT specialist and underwent sinus surgery (sphenoid and ethmoid sinuses).  Symptoms improved, but then he had a cracked tooth that required tooth extraction in August that also helped.  However, his headache of lesser intensity still continues.    At the present time, he reports intermittent pressure headache that affects his bitemporal/bifrontal and top of the head regions that usually occurs after he coughs, sneezes, yells, has bowel movement or with quick bending down movements.  It is intense for 2 to 3 minutes and then could linger for up to an hour at 4/10 intensity.  It might happen up to 3-4 times per day.  He exercises regularly, 4 times per week, but did not notice it during that time.  Occasionally, he needs to take ibuprofen or Tylenol, but does not take it frequently.  He denies any additional associated complaints or focal neurological symptoms.    Family history is positive for seizures/epilepsy (sister) and Parkinson's disease (maternal grandfather).    According to scanned report, the patient was seen by Dr. oWlff ([facility]) in March 2020 regarding facial paresthesias (with initial evaluation dating back in December 2019).  Work-up at that  "time included brain MRI (see results below), labs (folate, vitamin B12, TSH, methylmalonic acid, Lyme serology, and SYLVIA) and EEG, which were normal.  He was also seen at [facility] by a neurologist, but no cause of his symptoms were found.    Laboratory evaluation from June 2022 is notable for elevated total bilirubin (1.1) and LDL of 143.  His TSH (1.83), electrolytes/creatinine, and CBC were normal, and COVID-19 PCR was negative at that time.  Vitamin B12 was normal in February 2020 (478).    Brain MRI with and without contrast from 5/9/2018 was read as normal. Brain MRI without contrast from 11/7/2019 demonstrated minimal nonspecific white matter changes.  Retrospectively, they were present on study from 2018 according to my personal review of images and independent interpretation.    No additional useful information is available in Care Everywhere, which was reviewed.  PAST MEDICAL/SURGICAL HISTORY:                                                    I personally reviewed patient's past medical and surgical history with the patient at today's visit.  MEDICATIONS:                                                    I personally reviewed patient's medications and allergies with the patient at today's visit.  ALLERGIES:                                                    No Known Allergies  EXAM:                                                    VITAL SIGNS:   /74   Pulse 70   Ht 1.816 m (5' 11.5\")   Wt 83.5 kg (184 lb)   SpO2 99%   BMI 25.31 kg/m    Mini-Cog Assessment:  Mini Cog Assessment  Clock Draw Score: 2 Normal  3 Item Recall: 3 objects recalled  Mini Cog Total Score: 5    General: pt is in NAD, cooperative.  Skin: normal turgor, moist mucous membranes, no lesions/rashes noticed.  HEENT: ATNC, EOMI, PERRL, white sclera, normal conjunctiva, no nystagmus or ptosis. No carotid bruits bilaterally.  Respiratory: lung sounds clear to auscultation bilaterally, no crackles, wheezes, rhonchi. Symmetric " lung excursion, no accessory respiratory muscle use.  Cardiovascular: normal S1/S2, no murmurs/rubs/gallops.   Abdomen: Not distended.  : deferred.    Neurological:  Mental: alert, follows commands, Mini Cog Total Score: 5/5 with 3/3 on memory recall, no aphasia or dysarthria. Fund of knowledge is appropriate for age.  Cranial Nerves:  CN II: visual acuity - able to accurately count fingers with each eye. Visual fields intact, fundi: discs sharp, no papilledema and normal vessels bilaterally.  CN III, IV, VI: EOM intact, pupils equal and reactive  CN V: facial sensation nl  CN VII: face symmetric, no facial droop  CN VIII: hearing normal  CN IX: palate elevation symmetric, uvula at midline  CN XI SCM normal, shoulder shrug nl  CN XII: tongue midline  Motor: Strength: 5/5 in all major groups of all extremities. Normal tone. No abnormal movements. No pronator drift b/l.  Reflexes: Triceps, biceps, brachioradialis, patellar, and achilles reflexes normal and symmetric. No clonus noted. Toes are down-going b/l.   Sensory: light touch, pinprick, and vibration intact. Romberg: negative.  Coordination: FNF and heel-shin tests intact b/l.   Gait:  Normal, able to tandem walk without difficulty.  DATA:   LABS/EEG/IMAGING/OTHER STUDIES: I reviewed pertinent medical records, as detailed in the history of present illness.  ASSESSMENT AND PLAN:      ASSESSMENT: Harshad Ng is a 57 year old male patient with listed above past medical history, who presents with cough/Valsalva maneuver related headaches since February 2022.    We had a detailed discussion with the patient regarding his presenting complaints.  The neurological exam today is non-focal.  His previous brain imaging was unrevealing, but headaches started after his last brain MRI.      We discussed that clinical presentation might be consistent with primary cough headache, but could also have secondary causes, such as structural brain lesions and cerebral  aneurysms.  The likelihood is low, but not totally excluded.  For that reason, we decided to start with brain MRI and head MRA to evaluate for serious causes.  If negative, we will consider symptomatic treatment with indomethacin or propranolol.  Naproxen or gabapentin could also be tried.    DIAGNOSES:    ICD-10-CM    1. Cough headache  G44.83 Adult Neurology  Referral     MR Brain w/o Contrast     MRA Head w/o Contrast Angiogram     PLAN: At today's visit we thoroughly discussed various diagnostic possibilities for patient's symptoms, necessary evaluation, and the plan, which includes:  Orders Placed This Encounter   Procedures     MR Brain w/o Contrast     MRA Head w/o Contrast Angiogram     No new medications, but we discussed treatment options of indomethacin and propranolol if imaging is unrevealing.    Next follow-up appointment is in the next 4 weeks or earlier if needed.    Total Time: 63 minutes spent on the date of the encounter doing chart review, history and exam, documentation and further activities per the note.    Gigi Agosto MD  [facility]  (Chart documentation was completed in part with Dragon voice-recognition software. Even though reviewed, some grammatical, spelling, and word errors may remain.)         Yes

## 2024-08-04 ENCOUNTER — HEALTH MAINTENANCE LETTER (OUTPATIENT)
Age: 59
End: 2024-08-04

## 2024-08-07 NOTE — PROGRESS NOTES
ESTABLISHED PATIENT NEUROLOGY NOTE    DATE OF VISIT: 8/8/2024  CLINIC LOCATION: [facility]  MRN: [id number]  PATIENT NAME: Harshad Ng  YOB: 1965    REASON FOR VISIT: No chief complaint on file.    SUBJECTIVE:                                                      HISTORY OF PRESENT ILLNESS: Patient is here to follow up regarding primary cough headaches.  The last visit was on 3/5/2024.  The plan was to taper panel.  Please refer to my initial/other prior notes for further information.    Since the last visit, the patient reports ***.  He was on 10 mg of propranolol twice daily without noticeable side effects and successfully tapered off it.  He denies interval development of new neurological symptoms.  EXAM:                                                    Physical Exam:   Vitals: There were no vitals taken for this visit.    General: pt is in NAD, cooperative.  Skin: normal turgor, moist mucous membranes, no lesions/rashes noticed.  HEENT: ATNC, white sclera, normal conjunctiva.  Respiratory: Symmetric lung excursion, no accessory respiratory muscle use.  Abdomen: Non distended.  Neurological: awake, cooperative, follows commands, no exam changes compared to the previous visits.  ASSESSMENT AND PLAN:                                                    Assessment: 59 year old male patient presents for follow-up of primary cough headache.  He reports ***.  We discussed that if headaches recur, propranolol could be restarted.    Diagnoses:    ICD-10-CM    1. Primary cough headache  G44.83         Plan:  There are no Patient Instructions on file for this visit.    Total Time: *** minutes spent on the date of the encounter doing chart review, history and exam, documentation and further activities per the note.    Gigi Agosto MD  [facility]  (Chart documentation was completed in part with Dragon voice-recognition software. Even though reviewed, some  grammatical, spelling, and word errors may remain.)

## 2024-08-08 ENCOUNTER — OFFICE VISIT (OUTPATIENT)
Dept: NEUROLOGY | Facility: CLINIC | Age: 59
End: 2024-08-08
Payer: COMMERCIAL

## 2024-08-08 VITALS — OXYGEN SATURATION: 97 % | SYSTOLIC BLOOD PRESSURE: 125 MMHG | DIASTOLIC BLOOD PRESSURE: 86 MMHG | HEART RATE: 79 BPM

## 2024-08-08 DIAGNOSIS — G44.83 PRIMARY COUGH HEADACHE: Primary | ICD-10-CM

## 2024-08-08 PROCEDURE — 99212 OFFICE O/P EST SF 10 MIN: CPT | Performed by: PSYCHIATRY & NEUROLOGY

## 2024-08-08 NOTE — PROGRESS NOTES
ESTABLISHED PATIENT NEUROLOGY NOTE    DATE OF VISIT: 8/8/2024  CLINIC LOCATION: [facility]  MRN: [id number]  PATIENT NAME: Harshad Ng  YOB: 1965    REASON FOR VISIT:   Chief Complaint   Patient presents with    Follow Up     Cough Induced Headaches- no longer having headaches stopped medication and still doing well      SUBJECTIVE:                                                      HISTORY OF PRESENT ILLNESS: Patient is here to follow up regarding primary cough headaches.  The last visit was on 3/5/2024.  The plan was to taper off propranolol.  Please refer to my initial/other prior notes for further information.    Since the last visit, the patient denies headache recurrence.  He was on 10 mg of propranolol twice daily without noticeable side effects and successfully tapered off it in May.  He denies interval development of new neurological symptoms.  EXAM:                                                    Physical Exam:   Vitals: /86 (BP Location: Left arm, Patient Position: Sitting, Cuff Size: Adult Regular)   Pulse 79   SpO2 97%     General: pt is in NAD, cooperative.  Skin: normal turgor, moist mucous membranes, no lesions/rashes noticed.  HEENT: ATNC, white sclera, normal conjunctiva.  Respiratory: Symmetric lung excursion, no accessory respiratory muscle use.  Abdomen: Non distended.  Neurological: awake, cooperative, follows commands, no exam changes compared to the previous visits.  ASSESSMENT AND PLAN:                                                    Assessment: 59 year old male patient presents for follow-up of primary cough headache.  He reports no headache recurrence after tapering off propranolol.  We discussed that if headaches recur, propranolol could be restarted.  Advised him to reach out in such case.  Otherwise, no need to come back.    Diagnoses:    ICD-10-CM    1. Primary cough headache  G44.83         Plan: At today's visit we thoroughly  discussed current symptoms and the plan.    We decided to monitor for headache recurrence.  He will reach out/come back in such case.    Next follow-up appointment is on as needed basis.    Total Time: 11 minutes spent on the date of the encounter doing chart review, history and exam, documentation and further activities per the note.    Gigi Agosto MD  [facility]  (Chart documentation was completed in part with Dragon voice-recognition software. Even though reviewed, some grammatical, spelling, and word errors may remain.)

## 2024-08-08 NOTE — PATIENT INSTRUCTIONS
AFTER VISIT SUMMARY (AVS):    At today's visit we thoroughly discussed current symptoms and the plan.    We decided to monitor for headache recurrence.  Please reach out/come back in such case.    Next follow-up appointment is on as needed basis.    Please do not hesitate to call me with any questions or concerns.    Thanks.

## 2024-08-08 NOTE — PROGRESS NOTES
"Harshad Ng is a 59 year old male who presents for:  Chief Complaint   Patient presents with    Follow Up     Cough Induced Headaches- no longer having headaches stopped medication and still doing well         Initial Vitals:  /86 (BP Location: Left arm, Patient Position: Sitting, Cuff Size: Adult Regular)   Pulse 79   SpO2 97%  Estimated body mass index is 25.13 kg/m  as calculated from the following:    Height as of 9/7/23: 1.835 m (6' 0.25\").    Weight as of 9/7/23: 84.6 kg (186 lb 9.6 oz).. There is no height or weight on file to calculate BSA. BP completed using cuff size: regular    Sowmya Segura     "

## 2024-08-08 NOTE — LETTER
8/8/2024      Harshad Ng  [address] [address]      Dear Colleague,    Thank you for referring your patient, Harshad Ng, to the [facility]. Please see a copy of my visit note below.    ESTABLISHED PATIENT NEUROLOGY NOTE    DATE OF VISIT: 8/8/2024  CLINIC LOCATION: [facility]  MRN: [id number]  PATIENT NAME: Harshad Ng  YOB: 1965    REASON FOR VISIT:   Chief Complaint   Patient presents with     Follow Up     Cough Induced Headaches- no longer having headaches stopped medication and still doing well      SUBJECTIVE:                                                      HISTORY OF PRESENT ILLNESS: Patient is here to follow up regarding primary cough headaches.  The last visit was on 3/5/2024.  The plan was to taper off propranolol.  Please refer to my initial/other prior notes for further information.    Since the last visit, the patient denies headache recurrence.  He was on 10 mg of propranolol twice daily without noticeable side effects and successfully tapered off it in May.  He denies interval development of new neurological symptoms.  EXAM:                                                    Physical Exam:   Vitals: /86 (BP Location: Left arm, Patient Position: Sitting, Cuff Size: Adult Regular)   Pulse 79   SpO2 97%     General: pt is in NAD, cooperative.  Skin: normal turgor, moist mucous membranes, no lesions/rashes noticed.  HEENT: ATNC, white sclera, normal conjunctiva.  Respiratory: Symmetric lung excursion, no accessory respiratory muscle use.  Abdomen: Non distended.  Neurological: awake, cooperative, follows commands, no exam changes compared to the previous visits.  ASSESSMENT AND PLAN:                                                    Assessment: 59 year old male patient presents for follow-up of primary cough headache.  He reports no headache recurrence after tapering off propranolol.   "We discussed that if headaches recur, propranolol could be restarted.  Advised him to reach out in such case.  Otherwise, no need to come back.    Diagnoses:    ICD-10-CM    1. Primary cough headache  G44.83         Plan: At today's visit we thoroughly discussed current symptoms and the plan.    We decided to monitor for headache recurrence.  He will reach out/come back in such case.    Next follow-up appointment is on as needed basis.    Total Time: 11 minutes spent on the date of the encounter doing chart review, history and exam, documentation and further activities per the note.    Gigi Agosto MD  [facility]  (Chart documentation was completed in part with Dragon voice-recognition software. Even though reviewed, some grammatical, spelling, and word errors may remain.)    Harshad Ng is a 59 year old male who presents for:  Chief Complaint   Patient presents with     Follow Up     Cough Induced Headaches- no longer having headaches stopped medication and still doing well         Initial Vitals:  /86 (BP Location: Left arm, Patient Position: Sitting, Cuff Size: Adult Regular)   Pulse 79   SpO2 97%  Estimated body mass index is 25.13 kg/m  as calculated from the following:    Height as of 9/7/23: 1.835 m (6' 0.25\").    Weight as of 9/7/23: 84.6 kg (186 lb 9.6 oz).. There is no height or weight on file to calculate BSA. BP completed using cuff size: regular    Sowmya Segura       Again, thank you for allowing me to participate in the care of your patient.        Sincerely,        Gigi Agosto MD  "

## 2024-12-10 ENCOUNTER — OFFICE VISIT (OUTPATIENT)
Dept: INTERNAL MEDICINE | Facility: CLINIC | Age: 59
End: 2024-12-10
Payer: COMMERCIAL

## 2024-12-10 VITALS
HEIGHT: 71 IN | RESPIRATION RATE: 16 BRPM | WEIGHT: 189.3 LBS | TEMPERATURE: 97.6 F | SYSTOLIC BLOOD PRESSURE: 122 MMHG | BODY MASS INDEX: 26.5 KG/M2 | OXYGEN SATURATION: 99 % | HEART RATE: 60 BPM | DIASTOLIC BLOOD PRESSURE: 70 MMHG

## 2024-12-10 DIAGNOSIS — Z01.818 PREOPERATIVE EXAMINATION: Primary | ICD-10-CM

## 2024-12-10 LAB
ALBUMIN SERPL BCG-MCNC: 4.7 G/DL (ref 3.5–5.2)
ALP SERPL-CCNC: 54 U/L (ref 40–150)
ALT SERPL W P-5'-P-CCNC: 36 U/L (ref 0–70)
ANION GAP SERPL CALCULATED.3IONS-SCNC: 8 MMOL/L (ref 7–15)
AST SERPL W P-5'-P-CCNC: 30 U/L (ref 0–45)
BILIRUB SERPL-MCNC: 0.7 MG/DL
BUN SERPL-MCNC: 16.8 MG/DL (ref 6–20)
CALCIUM SERPL-MCNC: 9.6 MG/DL (ref 8.8–10.4)
CHLORIDE SERPL-SCNC: 103 MMOL/L (ref 98–107)
CREAT SERPL-MCNC: 1.06 MG/DL (ref 0.67–1.17)
EGFRCR SERPLBLD CKD-EPI 2021: 82 ML/MIN/1.73M2
ERYTHROCYTE [DISTWIDTH] IN BLOOD BY AUTOMATED COUNT: 11.8 % (ref 10–15)
GLUCOSE SERPL-MCNC: 93 MG/DL (ref 70–99)
HCO3 SERPL-SCNC: 29 MMOL/L (ref 22–29)
HCT VFR BLD AUTO: 43.9 % (ref 40–53)
HGB BLD-MCNC: 15.2 G/DL (ref 13.3–17.7)
MCH RBC QN AUTO: 31.6 PG (ref 26.5–33)
MCHC RBC AUTO-ENTMCNC: 34.6 G/DL (ref 31.5–36.5)
MCV RBC AUTO: 91 FL (ref 78–100)
PLATELET # BLD AUTO: 201 10E3/UL (ref 150–450)
POTASSIUM SERPL-SCNC: 4.5 MMOL/L (ref 3.4–5.3)
PROT SERPL-MCNC: 7 G/DL (ref 6.4–8.3)
RBC # BLD AUTO: 4.81 10E6/UL (ref 4.4–5.9)
SODIUM SERPL-SCNC: 140 MMOL/L (ref 135–145)
WBC # BLD AUTO: 6.7 10E3/UL (ref 4–11)

## 2024-12-10 PROCEDURE — 36415 COLL VENOUS BLD VENIPUNCTURE: CPT | Performed by: INTERNAL MEDICINE

## 2024-12-10 PROCEDURE — 99214 OFFICE O/P EST MOD 30 MIN: CPT | Performed by: INTERNAL MEDICINE

## 2024-12-10 PROCEDURE — 85027 COMPLETE CBC AUTOMATED: CPT | Performed by: INTERNAL MEDICINE

## 2024-12-10 PROCEDURE — G2211 COMPLEX E/M VISIT ADD ON: HCPCS | Performed by: INTERNAL MEDICINE

## 2024-12-10 PROCEDURE — 80053 COMPREHEN METABOLIC PANEL: CPT | Performed by: INTERNAL MEDICINE

## 2024-12-10 ASSESSMENT — PAIN SCALES - GENERAL: PAINLEVEL_OUTOF10: NO PAIN (0)

## 2024-12-10 NOTE — PROGRESS NOTES
Preoperative Evaluation  [address] [address]  Phone: [phone]  Fax: [phone]  Primary Provider: Harshad Romero MD  Pre-op Performing Provider: Harshad Romero MD  Dec 10, 2024   {Provider  Link to PREOP SmartSet  REQUIRED to apply standard patient instructions and medication directions to the AVS :947501}  {ROOMER review and update patient entered surgical information if needed :217594}        12/10/2024   Surgical Information   What procedure is being done? Right Shoulder / Torn labrom    Facility or Hospital where procedure/surgery will be performed: [facility]    Who is doing the procedure / surgery? Dr Jc Rivera    Date of surgery / procedure: January 9, 2025    Time of surgery / procedure: 11 am    Where do you plan to recover after surgery? at home with family        Patient-reported     Fax number for surgical facility: {:[phone]}    {Provider Charting Preference for Preop :584225}    Martin Miguel is a 59 year old, presenting for the following:  Pre-Op Exam          12/10/2024    10:54 AM   Additional Questions   Roomed by Rod GARCIA MA     HPI related to upcoming procedure: ***        12/10/2024   Pre-Op Questionnaire   Have you ever had a heart attack or stroke? No    Have you ever had surgery on your heart or blood vessels, such as a stent placement, a coronary artery bypass, or surgery on an artery in your head, neck, heart, or legs? No    Do you have chest pain with activity? No    Do you have a history of heart failure? No    Do you currently have a cold, bronchitis or symptoms of other infection? No    Do you have a cough, shortness of breath, or wheezing? No    Do you or anyone in your family have previous history of blood clots? No    Do you or does anyone in your family have a serious bleeding problem such as prolonged bleeding following surgeries or cuts? No    Have you ever had problems with anemia or been  told to take iron pills? No    Have you had any abnormal blood loss such as black, tarry or bloody stools? No    Have you ever had a blood transfusion? No    Are you willing to have a blood transfusion if it is medically needed before, during, or after your surgery? Yes    Have you or any of your relatives ever had problems with anesthesia? No    Do you have sleep apnea, excessive snoring or daytime drowsiness? No    Do you have any artifical heart valves or other implanted medical devices like a pacemaker, defibrillator, or continuous glucose monitor? No    Do you have artificial joints? No    Are you allergic to latex? No        Patient-reported     Health Care Directive  Patient does not have a Health Care Directive: {ADVANCE_DIRECTIVE_STATUS:404288}    Preoperative Review of   {Mnpmpreport:403875}  {Review MNPMP for all patients per [facility] MNPMP Profile:332643}    {Chronic problem details (Optional) :017595}    Patient Active Problem List    Diagnosis Date Noted    Cough 09/18/2020     Priority: Medium      Past Medical History:   Diagnosis Date    Cancer (H)     skin     Past Surgical History:   Procedure Laterality Date    HERNIA REPAIR      SOFT TISSUE SURGERY       Current Outpatient Medications   Medication Sig Dispense Refill    vitamin B-Complex Take 1 tablet by mouth daily      vitamin C (ASCORBIC ACID) 1000 MG TABS Take 1,000 mg by mouth daily      Vitamin D (Cholecalciferol) 25 MCG (1000 UT) CAPS       vitamin E (TOCOPHEROL) 400 units (180 mg) capsule Take 400 Units by mouth      Omega-3 Fatty Acids (ULTRA OMEGA 3) 1000 MG CAPS Take 1,000 mg by mouth (Patient not taking: Reported on 12/10/2024)      propranolol (INDERAL) 10 MG tablet Take 1 tablet (10 mg) by mouth 2 times daily (Patient not taking: Reported on 8/8/2024) 180 tablet 0    pyridOXINE (VITAMIN B6) 100 MG TABS Take 100 mg by mouth (Patient not taking: Reported on 12/10/2024)      selenium 50 MCG TABS tablet Take 50 mcg by mouth daily  "(Patient not taking: Reported on 12/10/2024)         No Known Allergies     Social History     Tobacco Use    Smoking status: Never    Smokeless tobacco: Never   Substance Use Topics    Alcohol use: Yes     Alcohol/week: 12.0 standard drinks of alcohol     {FAMILY HISTORY (Optional):320371109}  History   Drug Use Unknown           {ROS Picklists (Optional):691360}    Objective    /70 (BP Location: Right arm, Patient Position: Sitting, Cuff Size: Adult Regular)   Pulse 60   Temp 97.6  F (36.4  C) (Oral)   Resp 16   Ht 1.812 m (' \")   Wt 85.9 kg (189 lb 4.8 oz)   SpO2 99%   BMI 26.15 kg/m     Estimated body mass index is 26.15 kg/m  as calculated from the following:    Height as of this encounter: 1.812 m (' \").    Weight as of this encounter: 85.9 kg (189 lb 4.8 oz).  Physical Exam  {Exam List :954575}    No results for input(s): \"HGB\", \"PLT\", \"INR\", \"NA\", \"POTASSIUM\", \"CR\", \"A1C\" in the last 8760 hours.     Diagnostics  {LABS:905234}   {EK}    Revised Cardiac Risk Index (RCRI)  The patient has the following serious cardiovascular risks for perioperative complications:  {PREOP REVISED CARDIAC RISK INDEX (RCRI) :916191}     RCRI Interpretation: {REVISED CARDIAC RISK INTERPRETATION :075394}         Signed Electronically by: Harshad Romero MD  A copy of this evaluation report is provided to the requesting physician.    {Provider Resources  Preop [facility] Preop Guidelines  Revised Cardiac Risk Index :228411}   {Email feedback regarding this note to [email or web address]   :908339}  "

## 2025-05-27 ENCOUNTER — TRANSFERRED RECORDS (OUTPATIENT)
Dept: HEALTH INFORMATION MANAGEMENT | Facility: CLINIC | Age: 60
End: 2025-05-27
Payer: COMMERCIAL

## 2025-07-11 ENCOUNTER — TRANSFERRED RECORDS (OUTPATIENT)
Dept: HEALTH INFORMATION MANAGEMENT | Facility: CLINIC | Age: 60
End: 2025-07-11
Payer: COMMERCIAL

## 2025-08-16 ENCOUNTER — HEALTH MAINTENANCE LETTER (OUTPATIENT)
Age: 60
End: 2025-08-16